# Patient Record
Sex: FEMALE | Race: WHITE | Employment: FULL TIME | URBAN - METROPOLITAN AREA
[De-identification: names, ages, dates, MRNs, and addresses within clinical notes are randomized per-mention and may not be internally consistent; named-entity substitution may affect disease eponyms.]

---

## 2019-10-11 ENCOUNTER — OFFICE VISIT (OUTPATIENT)
Dept: FAMILY MEDICINE CLINIC | Age: 24
End: 2019-10-11
Payer: COMMERCIAL

## 2019-10-11 VITALS
TEMPERATURE: 98.2 F | BODY MASS INDEX: 29.08 KG/M2 | DIASTOLIC BLOOD PRESSURE: 80 MMHG | OXYGEN SATURATION: 98 % | HEIGHT: 62 IN | SYSTOLIC BLOOD PRESSURE: 100 MMHG | WEIGHT: 158 LBS | HEART RATE: 83 BPM

## 2019-10-11 DIAGNOSIS — Z13.79 GENETIC TESTING: ICD-10-CM

## 2019-10-11 DIAGNOSIS — Z80.8 FAMILY HISTORY OF MELANOMA: ICD-10-CM

## 2019-10-11 DIAGNOSIS — Z12.4 CERVICAL CANCER SCREENING: ICD-10-CM

## 2019-10-11 DIAGNOSIS — F41.1 GENERALIZED ANXIETY DISORDER: ICD-10-CM

## 2019-10-11 DIAGNOSIS — Z76.89 ENCOUNTER TO ESTABLISH CARE: Primary | ICD-10-CM

## 2019-10-11 DIAGNOSIS — Z80.3 FAMILY HISTORY OF BREAST CANCER: ICD-10-CM

## 2019-10-11 PROCEDURE — 99203 OFFICE O/P NEW LOW 30 MIN: CPT | Performed by: PHYSICIAN ASSISTANT

## 2019-10-11 RX ORDER — PAROXETINE HYDROCHLORIDE 25 MG/1
25 TABLET, FILM COATED, EXTENDED RELEASE ORAL EVERY MORNING
Qty: 90 TABLET | Refills: 3 | Status: SHIPPED | OUTPATIENT
Start: 2019-10-11 | End: 2020-07-16 | Stop reason: DRUGHIGH

## 2019-10-11 RX ORDER — PAROXETINE HYDROCHLORIDE 25 MG/1
25 TABLET, FILM COATED, EXTENDED RELEASE ORAL EVERY MORNING
Refills: 2 | COMMUNITY
Start: 2019-07-09 | End: 2019-10-11 | Stop reason: SDUPTHER

## 2019-10-11 SDOH — HEALTH STABILITY: MENTAL HEALTH: HOW OFTEN DO YOU HAVE A DRINK CONTAINING ALCOHOL?: NEVER

## 2019-10-11 ASSESSMENT — ENCOUNTER SYMPTOMS
VOMITING: 0
VOICE CHANGE: 0
DIARRHEA: 0
BLOOD IN STOOL: 0
COUGH: 0
TROUBLE SWALLOWING: 0
SHORTNESS OF BREATH: 0
WHEEZING: 0
COLOR CHANGE: 0
ABDOMINAL PAIN: 0

## 2019-10-11 ASSESSMENT — PATIENT HEALTH QUESTIONNAIRE - PHQ9
2. FEELING DOWN, DEPRESSED OR HOPELESS: 0
SUM OF ALL RESPONSES TO PHQ9 QUESTIONS 1 & 2: 0
DEPRESSION UNABLE TO ASSESS: FUNCTIONAL CAPACITY MOTIVATION LIMITS ACCURACY
1. LITTLE INTEREST OR PLEASURE IN DOING THINGS: 0
SUM OF ALL RESPONSES TO PHQ QUESTIONS 1-9: 0
SUM OF ALL RESPONSES TO PHQ QUESTIONS 1-9: 0

## 2019-12-12 ENCOUNTER — OFFICE VISIT (OUTPATIENT)
Dept: DERMATOLOGY | Age: 24
End: 2019-12-12
Payer: COMMERCIAL

## 2019-12-12 DIAGNOSIS — L70.0 ACNE VULGARIS: Primary | ICD-10-CM

## 2019-12-12 PROCEDURE — 99203 OFFICE O/P NEW LOW 30 MIN: CPT | Performed by: DERMATOLOGY

## 2019-12-12 RX ORDER — TRETINOIN 0.5 MG/G
CREAM TOPICAL
Qty: 45 G | Refills: 4 | Status: SHIPPED | OUTPATIENT
Start: 2019-12-12 | End: 2020-07-16

## 2019-12-12 RX ORDER — SPIRONOLACTONE 50 MG/1
50 TABLET, FILM COATED ORAL DAILY
Qty: 90 TABLET | Refills: 3 | Status: SHIPPED | OUTPATIENT
Start: 2019-12-12 | End: 2020-03-12 | Stop reason: SDUPTHER

## 2019-12-13 ENCOUNTER — TELEPHONE (OUTPATIENT)
Dept: DERMATOLOGY | Age: 24
End: 2019-12-13

## 2019-12-13 NOTE — TELEPHONE ENCOUNTER
Lukasz Reina Key: [id number] - [place] Case ID: [id number] - Rx #: [id number] Need help?  Call us at [phone]   Status   Sent to [facility]   DrugTretinoin 0.05% cream   FormOptumRx Electronic Prior Authorization Form (3298 NCPDP)   Original Claim [id number]

## 2020-01-17 ENCOUNTER — HOSPITAL ENCOUNTER (OUTPATIENT)
Age: 25
Discharge: HOME | End: 2020-01-17
Payer: COMMERCIAL

## 2020-01-17 ENCOUNTER — OFFICE VISIT (OUTPATIENT)
Dept: FAMILY MEDICINE CLINIC | Age: 25
End: 2020-01-17
Payer: COMMERCIAL

## 2020-01-17 ENCOUNTER — HOSPITAL ENCOUNTER (OUTPATIENT)
Dept: GENERAL RADIOLOGY | Age: 25
Discharge: HOME | End: 2020-01-17
Payer: COMMERCIAL

## 2020-01-17 VITALS
DIASTOLIC BLOOD PRESSURE: 60 MMHG | BODY MASS INDEX: 29.08 KG/M2 | HEART RATE: 89 BPM | WEIGHT: 159 LBS | OXYGEN SATURATION: 98 % | SYSTOLIC BLOOD PRESSURE: 100 MMHG

## 2020-01-17 PROCEDURE — 73502 X-RAY EXAM HIP UNI 2-3 VIEWS: CPT

## 2020-01-17 PROCEDURE — 99213 OFFICE O/P EST LOW 20 MIN: CPT | Performed by: PHYSICIAN ASSISTANT

## 2020-01-17 ASSESSMENT — ENCOUNTER SYMPTOMS: COLOR CHANGE: 0

## 2020-01-17 ASSESSMENT — PATIENT HEALTH QUESTIONNAIRE - PHQ9
SUM OF ALL RESPONSES TO PHQ9 QUESTIONS 1 & 2: 0
SUM OF ALL RESPONSES TO PHQ QUESTIONS 1-9: 0
2. FEELING DOWN, DEPRESSED OR HOPELESS: 0
SUM OF ALL RESPONSES TO PHQ QUESTIONS 1-9: 0
1. LITTLE INTEREST OR PLEASURE IN DOING THINGS: 0

## 2020-01-17 NOTE — PROGRESS NOTES
VIEWS); Future    Trochanteric bursitis of left hip        -    Discussed treatment for this is by rest, avoiding repetitive exercises and possibly steroidal injection. She declines injection at this time which is reasonable given that ibuprofen is helpful.

## 2020-03-12 ENCOUNTER — OFFICE VISIT (OUTPATIENT)
Dept: DERMATOLOGY | Age: 25
End: 2020-03-12
Payer: COMMERCIAL

## 2020-03-12 PROBLEM — L70.0 ACNE VULGARIS: Status: ACTIVE | Noted: 2020-03-12

## 2020-03-12 PROBLEM — D22.9 MULTIPLE BENIGN MELANOCYTIC NEVI: Status: ACTIVE | Noted: 2020-03-12

## 2020-03-12 PROBLEM — Z80.8 FAMILY HISTORY OF MALIGNANT MELANOMA: Status: ACTIVE | Noted: 2020-03-12

## 2020-03-12 PROBLEM — Z12.83 SKIN CANCER SCREENING: Status: ACTIVE | Noted: 2020-03-12

## 2020-03-12 PROCEDURE — 99214 OFFICE O/P EST MOD 30 MIN: CPT | Performed by: DERMATOLOGY

## 2020-03-12 RX ORDER — SPIRONOLACTONE 100 MG/1
100 TABLET, FILM COATED ORAL DAILY
Qty: 90 TABLET | Refills: 3 | Status: SHIPPED | OUTPATIENT
Start: 2020-03-12 | End: 2022-03-11

## 2020-03-12 NOTE — PATIENT INSTRUCTIONS
Please be mindful of your sun protection strategies, including use of broad spectrum sunscreen with SPF of at least 30, sun guard clothing with UPF (available at most sporting [employer] stores), broad brimmed hat, sunglasses, and sun avoidance during peak hours of the day. ABCDE's of melanoma to take note of:     ASYMMETRY OF MOLE,   BORDER STRANGE,   COLOR CHANGING OR BECOMING DARKER,   DIAMETER ENLARGING,   EVOLUTION (EXISTING MOLE IS CHANGING WITH ANY OF THE AFOREMENTIONED CHANGES, MOLE IS BLEEDING OR TENDER, OR NEW MOLES DEVELOPING SUDDENLY)    Also, please be sure to see dentist twice yearly and ensure someone (whether it's me,  your PCP, or gynecologist) is looking at genitals once yearly. Melanoma and other skin cancers can occur anywhere! Please email me through [place] or call office if any spot or mole changes, defined as becoming larger, darker, multi-colored, or becomes symptomatic (bleeding, tender, etc).

## 2020-03-12 NOTE — PROGRESS NOTES
of the following and were unremarkable except as otherwise noted below:scalp, hair, face, ears, mucosa of gums/teeth/cutaneous and mucosal lips, buccal mucosa, oropharynx, neck, breast/axilla/chest, abdomen, groin, back, buttocks, RUE, LUE, RLE, LLE, digits/nails, apocrine/eccrine glands; genital exam deferred per pt request    Abnormalities noted include:    1.) Lower face with few erythematous papules and pustules admixed with closed comedones and atrophic scars  2.) Torso and extremities with several variably sized scattered brown to tan round smooth melanocytic macules and papules       ASSESSMENT AND PLAN:    1.)  Acne vulgaris, mixed, mod severe:  - Cont tret 0.05% cream qhs; edu: photosensitivity, irritation, desquamation, redness   - Increase aldactone to 100 mg daily; edu: re risks of hyperkalemia, monitor for mm cramps or weakness; caution with high K+ foods, use of ACE-I, ARBs, aliskiren, MIRTA/Amita OCPs, agranulocytosis (rare), teratogenicity (feminization of male fetuses), presence in breast milk, category X medication, possible need for birth control, small risk of estrogen-dependent tumors (found in rats taking doses up to 250x those humans ingest), weight gain or even loss with diuresis, menstrual irregularity, menorrhagia, painful periods, breast tenderness. Complete androgen suppression may take 4-12 months of therapy   - Consider accutane in future giving scarring potential    2.) Multiple scattered acquired melanocytic nevi with banal features:   - Reassurance  - Edu: patient regarding sun protection strategies, including use of broad spectrum sunscreen with SPF of at least 30, sun guard clothing, broad brimmed hat, sunglasses, and sun avoidance during peak hours of the day. ABCDE's of melanoma also reviewed    3.) Family h/o MM in 1st deg relative:  - Recommend yearly FBSE    Return to Clinic:  3 mo; yearly skin checks  Discussed plan with patient and/or primary caretaker.    Patient to call

## 2020-04-11 PROBLEM — Z12.83 SKIN CANCER SCREENING: Status: RESOLVED | Noted: 2020-03-12 | Resolved: 2020-04-11

## 2020-05-20 ENCOUNTER — TELEPHONE (OUTPATIENT)
Dept: FAMILY MEDICINE CLINIC | Age: 25
End: 2020-05-20

## 2020-05-21 ENCOUNTER — TELEMEDICINE (OUTPATIENT)
Dept: FAMILY MEDICINE CLINIC | Age: 25
End: 2020-05-21
Payer: COMMERCIAL

## 2020-05-21 PROCEDURE — 99213 OFFICE O/P EST LOW 20 MIN: CPT | Performed by: PHYSICIAN ASSISTANT

## 2020-05-21 RX ORDER — METHYLPREDNISOLONE 4 MG/1
TABLET ORAL
Qty: 1 KIT | Refills: 0 | Status: SHIPPED | OUTPATIENT
Start: 2020-05-21 | End: 2020-05-27

## 2020-05-21 ASSESSMENT — ENCOUNTER SYMPTOMS: COLOR CHANGE: 0

## 2020-05-21 NOTE — PROGRESS NOTES
2020    TELEHEALTH EVALUATION -- Audio/Visual (During OTHFK-81 public health emergency)    HPI:    Eugenio Cross (:  1995) has requested an audio/video evaluation for the following concern(s):    Knee Pain: Started two weeks ago in both knees. Painful if she presses on the knee cap and just below the patella. She denies any redness or swelling of either joint. There is no weakness, laxity, or instability noted. Aggravated by a work out routine and walking up stairs. Has been using icy hot at home, ice packs,ibuprofen and heating pad. She stopped this exercise routine 3 days ago and has noticed some improved. No known injury. Review of Systems   Constitutional: Positive for activity change. Musculoskeletal: Positive for arthralgias. Negative for gait problem, joint swelling and myalgias. Skin: Negative for color change. Neurological: Negative for weakness and numbness. Prior to Visit Medications    Medication Sig Taking? Authorizing Provider   methylPREDNISolone (MEDROL, ALEN,) 4 MG tablet Take by mouth as directed.  Yes BRIONNA Frank   spironolactone (ALDACTONE) 100 MG tablet Take 1 tablet by mouth daily Yes Ping Jackson MD   tretinoin (RETIN-A) 0.05 % cream Apply a pea sized amount to the face QHS Yes Ping Jackson MD   PARoxetine (PAXIL CR) 25 MG extended release tablet Take 1 tablet by mouth every morning Yes BRIONNA Frank       Social History     Tobacco Use    Smoking status: Never Smoker    Smokeless tobacco: Never Used   Substance Use Topics    Alcohol use: Never     Frequency: Never    Drug use: Never        No Known Allergies, No past medical history on file., No past surgical history on file.,   Social History     Tobacco Use    Smoking status: Never Smoker    Smokeless tobacco: Never Used   Substance Use Topics    Alcohol use: Never     Frequency: Never    Drug use: Never       PHYSICAL EXAMINATION:  [ INSTRUCTIONS:  \"[x]\" Indicates a positive item  \"[]\" Indicates a negative item  -- DELETE ALL ITEMS NOT EXAMINED]  Vital Signs: (As obtained by patient/caregiver or practitioner observation)    Blood pressure-  Heart rate-    Respiratory rate- 14   Temperature-  Pulse oximetry-     Constitutional: [x] Appears well-developed and well-nourished [x] No apparent distress      [] Abnormal-   Mental status  [x] Alert and awake  [x] Oriented to person/place/time [x]Able to follow commands      Eyes:  EOM    []  Normal  [] Abnormal-  Sclera  []  Normal  [] Abnormal -         Discharge []  None visible  [] Abnormal -    HENT:   [x] Normocephalic, atraumatic. [] Abnormal   [] Mouth/Throat: Mucous membranes are moist.     External Ears [] Normal  [] Abnormal-     Neck: [] No visualized mass     Pulmonary/Chest: [x] Respiratory effort normal.  [] No visualized signs of difficulty breathing or respiratory distress        [] Abnormal-      Musculoskeletal:   [x] Normal gait with no signs of ataxia         [] Normal range of motion of neck        [] Abnormal-       Neurological:        [] No Facial Asymmetry (Cranial nerve 7 motor function) (limited exam to video visit)          [] No gaze palsy        [] Abnormal-         Skin:        [] No significant exanthematous lesions or discoloration noted on facial skin         [] Abnormal-            Psychiatric:       [] Normal Affect [] No Hallucinations        [] Abnormal-     Other pertinent observable physical exam findings- No swelling of knees, abnormal sounds with AROM or effusion noted through video    ASSESSMENT/PLAN:  1. Patellar tendinitis of both knees  -  Stop workout for the next two weeks. Ice as needed. Tylenol for pain. Start steroid. If there is no improvement or worsening symptoms please call.   - methylPREDNISolone (MEDROL, ALEN,) 4 MG tablet; Take by mouth as directed. Dispense: 1 kit; Refill: 0      No follow-ups on file.     Eugenio Cross is a 30 y.o. female being evaluated by a Virtual Visit (video

## 2020-06-26 ENCOUNTER — OFFICE VISIT (OUTPATIENT)
Dept: PRIMARY CARE CLINIC | Age: 25
End: 2020-06-26
Payer: COMMERCIAL

## 2020-06-26 PROCEDURE — 99213 OFFICE O/P EST LOW 20 MIN: CPT | Performed by: NURSE PRACTITIONER

## 2020-06-29 LAB
SARS-COV-2: NOT DETECTED
SOURCE: NORMAL

## 2020-07-16 ENCOUNTER — VIRTUAL VISIT (OUTPATIENT)
Dept: FAMILY MEDICINE CLINIC | Age: 25
End: 2020-07-16
Payer: COMMERCIAL

## 2020-07-16 VITALS — HEIGHT: 62 IN | BODY MASS INDEX: 27.6 KG/M2 | WEIGHT: 150 LBS

## 2020-07-16 PROBLEM — F41.1 GENERALIZED ANXIETY DISORDER: Status: ACTIVE | Noted: 2020-07-16

## 2020-07-16 PROBLEM — M76.52 PATELLAR TENDINITIS OF BOTH KNEES: Status: ACTIVE | Noted: 2020-07-16

## 2020-07-16 PROBLEM — M76.51 PATELLAR TENDINITIS OF BOTH KNEES: Status: ACTIVE | Noted: 2020-07-16

## 2020-07-16 PROCEDURE — 99213 OFFICE O/P EST LOW 20 MIN: CPT | Performed by: PHYSICIAN ASSISTANT

## 2020-07-16 RX ORDER — PAROXETINE HYDROCHLORIDE 40 MG/1
40 TABLET, FILM COATED ORAL DAILY
Qty: 30 TABLET | Refills: 3 | Status: SHIPPED | OUTPATIENT
Start: 2020-07-16 | End: 2020-11-12 | Stop reason: SDUPTHER

## 2020-07-16 ASSESSMENT — ENCOUNTER SYMPTOMS
COLOR CHANGE: 0
BACK PAIN: 0

## 2020-07-16 NOTE — PROGRESS NOTES
2020    TELEHEALTH EVALUATION -- Audio/Visual (During [place] [address] public health emergency)    HPI:    Gwyn  (:  1995) has requested an audio/video evaluation for the following concern(s): Anxiety:  Noticing increased anxiety since February. Current triggers: COVID. She is now having up to four panic attacks per month. She notices that she's more fatigued and sleeping more than normal. There is some mood changes but she denies having any depression. She also notices that she is easily overwhelmed. Sleep is variable but for the most part ok. She denies having any dysphoric mood, impulsive behaviors, SI/HI. Currently taking Paxil 25 mg. Pt is interested in dose adjustment. Patellar Tendonitis: Pt reports that steroid did improve symptoms for a short period of time but they are now back. Initial symptoms occurred in May of this year. She currently notices bilateral pain in both knees after working out. There is some stiffness in knees after being in the same position for an extended period of time. She denies any cracking, locking, swelling or instability. She is not taking anything for the pain. Pt is interested in seeing ortho. Review of Systems   Constitutional: Negative for unexpected weight change. Musculoskeletal: Positive for arthralgias. Negative for back pain, gait problem, joint swelling and myalgias. Skin: Negative for color change. Neurological: Negative for weakness and numbness. Psychiatric/Behavioral: Negative for agitation, behavioral problems, confusion, decreased concentration, dysphoric mood and hallucinations. The patient is nervous/anxious. The patient is not hyperactive. Prior to Visit Medications    Medication Sig Taking?  Authorizing Provider   PARoxetine (PAXIL) 40 MG tablet Take 1 tablet by mouth daily Yes BRIONNA Rangel   spironolactone (ALDACTONE) 100 MG tablet Take 1 tablet by mouth daily Yes Betty Verdin MD       Social History pleasant and cooperative  Other pertinent observable physical exam findings- No swelling or erythema of the bilateral knees    ASSESSMENT/PLAN:  1. Patellar tendinitis of both knees  -  NSAIDs, rest and ice as needed  - [address] [place], DO Leigha, Orthopedic Surgery, [facility]    2. Generalized anxiety disorder  -  Increase Paxil to 40 mg. Pt to follow up with me in four weeks if she is not noticing any improvement. Would consider changing her to Zoloft at that time. - PARoxetine (PAXIL) 40 MG tablet; Take 1 tablet by mouth daily  Dispense: 30 tablet; Refill: 3      No follow-ups on file. Sofiya Carlton is a 30 y.o. female being evaluated by a Virtual Visit (video visit) encounter to address concerns as mentioned above. A caregiver was present when appropriate. Due to this being a TeleHealth encounter (During QAB-59 public health emergency), evaluation of the following organ systems was limited: Vitals/Constitutional/EENT/Resp/CV/GI//MS/Neuro/Skin/Heme-Lymph-Imm. Pursuant to the emergency declaration under the [address] [place] [address], [address] [place] [address] authority and the [employer] and [employer] Act, this Virtual Visit was conducted with patient's (and/or legal guardian's) consent, to reduce the patient's risk of exposure to COVID-19 and provide necessary medical care. The patient (and/or legal guardian) has also been advised to contact this office for worsening conditions or problems, and seek emergency medical treatment and/or call 911 if deemed necessary. Patient identification was verified at the start of the visit: Yes    Total time spent on this encounter: Not billed by time    Services were provided through a video synchronous discussion virtually to substitute for in-person clinic visit. Patient and provider were located at their individual homes.     --BRIONNA Duran on 7/16/2020 at 10:59 AM    An electronic signature was used to authenticate this note.

## 2020-07-20 ENCOUNTER — OFFICE VISIT (OUTPATIENT)
Dept: ORTHOPEDIC SURGERY | Age: 25
End: 2020-07-20
Payer: COMMERCIAL

## 2020-07-20 VITALS — WEIGHT: 150 LBS | HEIGHT: 62 IN | BODY MASS INDEX: 27.6 KG/M2

## 2020-07-20 PROCEDURE — 99203 OFFICE O/P NEW LOW 30 MIN: CPT | Performed by: ORTHOPAEDIC SURGERY

## 2020-07-20 NOTE — PROGRESS NOTES
I am evaluating this patient as a consult at the request of BRIONNA Rojas [id number]  [facility], [address] [facility] [address]     Assessment : bilateral patellofemoral syndrome    Impression:  Encounter Diagnoses   Name Primary?  Pain in both knees, unspecified chronicity     Patellofemoral syndrome of both knees Yes       Office Procedures:  Orders Placed This Encounter   Procedures    XR KNEE RIGHT (MIN 4 VIEWS)     Order Specific Question:   Reason for exam:     Answer:   RIGHT KNEE PAIN    XR KNEE LEFT (MIN 4 VIEWS)     Order Specific Question:   Reason for exam:     Answer:   LEFT KNEE PAIN    [facility] PT - [facility]     Referral Priority:   Routine     Referral Type:   Eval and Treat     Referral Reason:   Specialty Services Required     Requested Specialty:   Physical Therapy     Number of Visits Requested:   1     No orders of the defined types were placed in this encounter. Treatment Plan: I had a lengthy discussion with the patient about the pathophysiology of patellafemoral syndrome , as well as treatment options. The importance of physical therapy for hamstring, VMO, core and glute strengthening was discussed in detail as well as the importance of avoidance of aggravating activites such as squats, lunges, stair climbing. I recommend conservative treatment, including physical therapy, and oral medications. Today we prescribed PT with dry needling and biomechanics for squat, lunge technique. They understand that PFS can require long-term physical therapy to alleviate symptoms. She will follow up in 2-3 months for reevaluation. Patient agrees with this plan, all of their questions were answered best of our ability and to their satisfaction.       Chief Complaint:  Knee Pain (Bilat knee pain, right greater than left)      History of Present Illness:  Nusrat Sotomayor is a 30 y.o. female here regarding bilateral knee pain, patient to 3 to 4 months ago she began doing his Physically abused: None     Forced sexual activity: None   Other Topics Concern    None   Social History Narrative    None     No Known Allergies    Review of Systems:  Constitutional: negative  Respiratory: negative  Cardiovascular: negative  Musculoskeletal:negative except for Knee Pain (Bilat knee pain, right greater than left)    Relevant review of systems reviewed and available in the patient's chart in media tab    Vital Signs:  Vitals:    07/20/20 1425   Weight: 150 lb (68 kg)   Height: 5' 2\" (1.575 m)         General Exam:   Constitutional: Patient is adequately groomed with no evidence of malnutrition  Mental Status: The patient is oriented to time, place and person. The patient's mood and affect are appropriate. Vascular: Examination reveals no swelling or calf tenderness. Peripheral pulses are palpable and 2+.    bilateral Knee Examination  Inspection:   No gross deformities noted. no swelling noted. No erythema or ecchymosis. Skin is intact. Palpation: negative Tenderness to palpation along the medial joint line, negative Tenderness to palpation along lateral joint line, positive Tenderness to palpation along medial and lateral facets of undersurface of the patella    Range of Motion:  0-130    Strength:  Able to do SLR    Special Tests:  ACL, MCL, PCL, LCL are intact with stress exam.  There negative crepitus noted with range of motion under the patella. A positive grind test.  negative Delmi's and Apley compression test.       Skin: There are no rashes, ulcerations or lesions. Gait: Normal gait    Reflex: not tested    Additional Examinations:    LUMBAR SPINE: The skin is warm and dry. There is no swelling, warmth, or erythema. Range of motion is within normal limits. There is no paraspinal or spinous process tenderness. Ipsilateral and contralateral straight leg raising tests are negative. The distal neurovascular exam is grossly intact and symmetric.     X-RAYS: 4 views

## 2020-11-12 RX ORDER — PAROXETINE HYDROCHLORIDE 40 MG/1
40 TABLET, FILM COATED ORAL DAILY
Qty: 30 TABLET | Refills: 3 | Status: SHIPPED | OUTPATIENT
Start: 2020-11-12 | End: 2021-04-12 | Stop reason: SDUPTHER

## 2020-11-12 NOTE — TELEPHONE ENCOUNTER
Last office visit 7/16/2020     Last written 7- x 3 refills    Next office visit scheduled  Not scheduled    Requested Prescriptions     Pending Prescriptions Disp Refills    PARoxetine (PAXIL) 40 MG tablet 30 tablet 3     Sig: Take 1 tablet by mouth daily

## 2020-12-28 ENCOUNTER — VIRTUAL VISIT (OUTPATIENT)
Dept: FAMILY MEDICINE CLINIC | Age: 25
End: 2020-12-28
Payer: COMMERCIAL

## 2020-12-28 PROCEDURE — 99213 OFFICE O/P EST LOW 20 MIN: CPT | Performed by: PHYSICIAN ASSISTANT

## 2020-12-28 RX ORDER — CYCLOBENZAPRINE HCL 5 MG
5 TABLET ORAL 3 TIMES DAILY PRN
Qty: 30 TABLET | Refills: 0 | Status: SHIPPED | OUTPATIENT
Start: 2020-12-28 | End: 2021-01-07

## 2020-12-28 RX ORDER — METHYLPREDNISOLONE 4 MG/1
TABLET ORAL
Qty: 1 KIT | Refills: 0 | Status: SHIPPED | OUTPATIENT
Start: 2020-12-28 | End: 2021-01-03

## 2020-12-28 ASSESSMENT — ENCOUNTER SYMPTOMS
BACK PAIN: 1
COLOR CHANGE: 0

## 2020-12-28 NOTE — PROGRESS NOTES
[ INSTRUCTIONS:  \"[x]\" Indicates a positive item  \"[]\" Indicates a negative item  -- DELETE ALL ITEMS NOT EXAMINED]  Vital Signs: (As obtained by patient/caregiver or practitioner observation)    Blood pressure-  Heart rate-    Respiratory rate- 14   Temperature-  Pulse oximetry-     Constitutional: [x] Appears well-developed and well-nourished [x] No apparent distress      [] Abnormal-   Mental status  [x] Alert and awake  [x] Oriented to person/place/time [x]Able to follow commands      Eyes:  EOM    []  Normal  [] Abnormal-  Sclera  []  Normal  [] Abnormal -         Discharge []  None visible  [] Abnormal -    HENT:   [] Normocephalic, atraumatic. [] Abnormal   [] Mouth/Throat: Mucous membranes are moist.     External Ears [] Normal  [] Abnormal-     Neck: [] No visualized mass     Pulmonary/Chest: [x] Respiratory effort normal.  [x] No visualized signs of difficulty breathing or respiratory distress        [] Abnormal-      Musculoskeletal:   [] Normal gait with no signs of ataxia         [] Normal range of motion of neck        [x] Abnormal- decreased flexion of lumbar spine due to pain. Neurological:        [] No Facial Asymmetry (Cranial nerve 7 motor function) (limited exam to video visit)          [] No gaze palsy        [] Abnormal-         Skin:        [] No significant exanthematous lesions or discoloration noted on facial skin         [] Abnormal-            Psychiatric:       [] Normal Affect [] No Hallucinations        [] Abnormal-     Other pertinent observable physical exam findings-     ASSESSMENT/PLAN:  1. Lumbar back pain  - methylPREDNISolone (MEDROL, ALEN,) 4 MG tablet; Take by mouth as directed. Dispense: 1 kit; Refill: 0  - cyclobenzaprine (FLEXERIL) 5 MG tablet; Take 1 tablet by mouth 3 times daily as needed for Muscle spasms  Dispense: 30 tablet; Refill: 0    2. Right hip pain  - methylPREDNISolone (MEDROL, ALEN,) 4 MG tablet; Take by mouth as directed. Dispense: 1 kit;  Refill: 0 Return if symptoms worsen or fail to improve. Carolina Eid is a 30 y.o. female being evaluated by a Virtual Visit (video visit) encounter to address concerns as mentioned above. A caregiver was present when appropriate. Due to this being a TeleHealth encounter (During ISYCX-88 public health emergency), evaluation of the following organ systems was limited: Vitals/Constitutional/EENT/Resp/CV/GI//MS/Neuro/Skin/Heme-Lymph-Imm. Pursuant to the emergency declaration under the [address] [place] [address], [address] [place] [address] authority and the [employer] and [employer] Act, this Virtual Visit was conducted with patient's (and/or legal guardian's) consent, to reduce the patient's risk of exposure to COVID-19 and provide necessary medical care. The patient (and/or legal guardian) has also been advised to contact this office for worsening conditions or problems, and seek emergency medical treatment and/or call 911 if deemed necessary. Patient identification was verified at the start of the visit: Yes    Total time spent on this encounter: Not billed by time    Services were provided through a video synchronous discussion virtually to substitute for in-person clinic visit. Patient and provider were located at their individual homes. --BRIONNA Hinton on 12/28/2020 at 4:47 PM    An electronic signature was used to authenticate this note.

## 2021-01-12 ENCOUNTER — HOSPITAL ENCOUNTER (OUTPATIENT)
Dept: PHYSICAL THERAPY | Age: 26
Setting detail: THERAPIES SERIES
Discharge: HOME | End: 2021-01-12
Payer: COMMERCIAL

## 2021-01-12 PROCEDURE — 97110 THERAPEUTIC EXERCISES: CPT

## 2021-01-12 PROCEDURE — 97161 PT EVAL LOW COMPLEX 20 MIN: CPT

## 2021-01-12 NOTE — FLOWSHEET NOTE
[facility], [facility], [address]  [place], [address]  Phone: [phone]  Fax [phone]    Physical Therapy Daily Treatment Note    Date:  2021    Patient Name:  Xin Whyte    :  1995  MRN: [id number]  Restrictions/Precautions:    Medical/Treatment Diagnosis Information:  Diagnosis: M22.2X1, M22.2X2 (ICD-10-CM) - Patellofemoral syndrome of both knees  Insurance/Certification information:  PT Insurance Information: [employer]  Physician Information:  Referring Practitioner: BRIONNA Hutton  Plan of care signed (Y/N):  Y  Visit# / total visits:       G-Code (if applicable):           Modified Oswestry:     Time in:   16:05      Timed Treatment: 15 Total Treatment Time:  50  Time out: 16:55  ________________________________________________________________________________________    Pain Level:    /10  SUBJECTIVE:  See Initial Evaluation     OBJECTIVE:     Exercise/Equipment Resistance/Repetitions Other comments     Bridges 5\"x10      Clamshell 5\"x10      TA 2 min      TG  5 min                                                                                                                   Other Therapeutic Activities:  Pt was educated on diagnosis, involved anatomy, POC, established goals, and facility policies     Manual Treatments:         Modalities:      Test/Measurements:   See Initial Evaluation        ASSESSMENT:   See Initial Evaluation        Treatment/Activity Tolerance:   [x]Patient tolerated treatment well [] Patient limited by fatique  []Patient limited by pain [] Patient limited by other medical complications  [] Other:     Goals:        Long term goals  Time Frame for Long term goals : 6 weeks  Long term goal 1: Pt will be independent and compliant with HEP  Long term goal 2: Pt will improve bilateral hip strength to 4/5 gross strength  Long term goal 3: Pt will report at least 50% improvement in frequency and intensity of back/hip pain  Long term goal 4: Pt will ambulate will proper hip extension and no sign of trendelenburg bilaterally  Long term goal 5: Pt will improve lumbopelvic strength to 4/5     Plan: [] Continue per plan of care [] Alter current plan (see comments)   [x] Plan of care initiated [] Hold pending MD visit [] Discharge      Plan for Next Session:      Re-Certification Due Date:         Signature:   Randi Alexander PT

## 2021-01-12 NOTE — PROGRESS NOTES
piriformis  Observation: Rounded shoulders, FHP, posterior pelvic tilt. Standing: pes planus, mild hip extension, increased lumbar lordosis. Ambulation, decreased hip extension, mild trendelenburg bilaterally. Body Mechanics: Squat: Mild IR of tibia, mild quad dominant SLS: compensated trendelenberg bilaterally    Joint Mobility  Spine: Lumba: Flexion: 25% restriced      Extension: 25% restricted    R Rotation full   L Rotation full    L Side Bending 25% restriction      R Side Bendin% restricted    Strength RLE  R Hip Flexion: 4+/5  R Hip Extension: 3+/5  R Hip ABduction: 3+/5  R Hip Internal Rotation: 3+/5  R Hip External Rotation: 3+/5  R Knee Flexion: 5/5  R Knee Extension: 5/5  R Ankle Dorsiflexion: 5/5  R Ankle Plantar flexion: 5/5  Strength LLE  Strength LLE: Exception  L Hip Flexion: 4+/5  L Hip Extension: 3+/5  L Hip ABduction: 3+/5  L Hip Internal Rotation: 3+/5  L Hip External Rotation: 3+/5  L Knee Flexion: 5/5  L Knee Extension: 5/5  L Ankle Dorsiflexion: 5/5  L Ankle Plantar Flexion: 5/5     Additional Measures  Special Tests: Clonus (-), Babinski (-), DTR: normal bilateral achilles and patella Slump (-), compression (-), distraction (-), FADDIR (-), DWIGHT (-)  Sensation  Overall Sensation Status: WNL    Assessment   Conditions Requiring Skilled Therapeutic Intervention  Body structures, Functions, Activity limitations: Decreased functional mobility ; Decreased balance;Decreased ADL status; Decreased ROM; Decreased high-level IADLs; Increased pain;Decreased strength;Decreased posture  Assessment: Pt is a 29 y/o female that presents to [address] [place] [address] clinic with an increase in hip and back pain that occured in mid December after trying to lift a box. Pt presents with impaired hip/lumbopelvic strength, gait mechanics, muscle firing patterns, and hip ROM. Pt also presented with L hip posterior rotation that was corrected after manual interventions.  Pt would benefit from skilled physical therapy in order to improve impairments and progres toward goals. Prognosis: Good  Decision Making: Low Complexity  REQUIRES PT FOLLOW UP: Yes         Plan   Plan  Times per week: 2x/week for 6 weeks  Plan weeks: 6 weeks  Current Treatment Recommendations: Strengthening, Neuromuscular Re-education, Home Exercise Program, ROM, Manual Therapy - Soft Tissue Mobilization, Balance Training, Functional Mobility Training, Manual Therapy - Joint Manipulation, Gait Training, Stair training, Pain Management    G-Code   Modified Oswestry: /50    Goals  Long term goals  Time Frame for Long term goals : 6 weeks  Long term goal 1: Pt will be independent and compliant with HEP  Long term goal 2: Pt will improve bilateral hip strength to 4/5 gross strength  Long term goal 3: Pt will report at least 50% improvement in frequency and intensity of back/hip pain  Long term goal 4: Pt will ambulate will proper hip extension and no sign of trendelenburg bilaterally  Long term goal 5: Pt will improve lumbopelvic strength to 4/5  Patient Goals   Patient goals : Pt stated she would like to decrease back pain       Therapy Time   Individual Concurrent Group Co-treatment   Time In 1605         Time Out 1655         Minutes 50         Timed Code Treatment Minutes: 69 Carlitos Lawton PT          Outpatient Physical Therapy  Phone: [phone] Fax: [phone]     To: BRIONNA Smith    From: Isabel Page PT     Patient: Samantha Ramon      : 1995  Diagnosis:M22.2X1, M22.2X2 (ICD-10-CM) - Patellofemoral syndrome of both knees          Date: 2021  Treatment Diagnosis:  Back, Hip, Knee Pain     Physical Therapy Certification/Re-Certification Form  Dear BRIONNA Ybarra  The following patient has been evaluated for physical therapy services and for therapy to continue, Medicare requires monthly physician review of the treatment plan.  Please review the attached evaluation and/or summary of the patient's plan of care, and verify that you agree therapy should continue by signing the attached document and sending it back to our office. Plan of Care/Treatment to date:  [x] Therapeutic Exercise   [x] Modalities:  [] Therapeutic Activity     [] Ultrasound  [] Electrical Stimulation   [x] Gait Training      [] Cervical Traction [] Lumbar Traction  [x] Neuromuscular Re-education  [] Hot/Coldpack [] Iontophoresis    [x] Instruction in HEP      Other:  [x] Manual Therapy       []                        [] Aquatic Therapy       []                      ? Frequency/Duration:  # Days per week: [] 1 day # Weeks: [] 1 week [] 5 weeks      [x] 2 days? [] 2 weeks [x] 6 weeks     [] 3 days   [] 3 weeks [] 7 weeks     [] 4 days   [] 4 weeks [] 8 weeks    Rehab Potential: [] Excellent [x] Good [] Fair  [] Poor       Electronically signed by:  Gabriella Dow PT      If you have any questions or concerns, please don't hesitate to call.   Thank you for your referral.    Physician Signature:________________________________Date:__________________  By signing above, therapists plan is approved by physician

## 2021-01-14 ENCOUNTER — HOSPITAL ENCOUNTER (OUTPATIENT)
Dept: PHYSICAL THERAPY | Age: 26
Setting detail: THERAPIES SERIES
Discharge: HOME | End: 2021-01-14
Payer: COMMERCIAL

## 2021-01-14 PROCEDURE — 97110 THERAPEUTIC EXERCISES: CPT

## 2021-01-14 PROCEDURE — 97140 MANUAL THERAPY 1/> REGIONS: CPT

## 2021-01-14 NOTE — FLOWSHEET NOTE
[facility], [facility], [address], [address]  Phone: [phone]  Fax [phone]    Physical Therapy Daily Treatment Note    Date:  2021    Patient Name:  Eulogio Marie    :  1995  MRN: [id number]  Restrictions/Precautions:    Medical/Treatment Diagnosis Information:  Diagnosis: M22.2X1, M22.2X2 (ICD-10-CM) - Patellofemoral syndrome of both knees  Insurance/Certification information:  PT Insurance Information: [employer]  Physician Information:  Referring Practitioner: BRIONNA Stokes  Plan of care signed (Y/N):  Y  Visit# / total visits:       G-Code (if applicable): Modified Oswestry:     Time in:   10:00      Timed Treatment: 45 Total Treatment Time:  45  Time out: 10:45  ________________________________________________________________________________________    Pain Level:    /10  SUBJECTIVE:  Pt reported that she was a little sore after her first visit. However, soreness went back down     OBJECTIVE:     Exercise/Equipment Resistance/Repetitions Other comments   Prone hip extension   PKF c ball x10B  x10      Bridges 5\"x10     Clamshell 5\"x10    TA     -BKFO     -Marches  2 min  x10B  x10B     Multifidus punch  x15B     TG  5 min                                                                                                                   Other Therapeutic Activities:      Manual Treatments:         Modalities:      Test/Measurements:    RLE LAD/compression grade 3-4  RLE neuro re-ed   L hip hit technique   STM to L piriformis and glutes   Lumbar gapping L        ASSESSMENT:  Pt tolerated treatment well as pt performed all manual and TE with no increase in pain. Core and hip strengthening progressed this date. HEP updated. Pt will progress as pt can tolerate.         Treatment/Activity Tolerance:   [x]Patient tolerated treatment well [] Patient limited by fatique  []Patient limited by pain [] Patient limited by other medical complications  [] Other:     Goals:        Long term goals  Time Frame for Long term goals : 6 weeks  Long term goal 1: Pt will be independent and compliant with HEP  Long term goal 2: Pt will improve bilateral hip strength to 4/5 gross strength  Long term goal 3: Pt will report at least 50% improvement in frequency and intensity of back/hip pain  Long term goal 4: Pt will ambulate will proper hip extension and no sign of trendelenburg bilaterally  Long term goal 5: Pt will improve lumbopelvic strength to 4/5     Plan: [x] Continue per plan of care [] Alter current plan (see comments)   [] Plan of care initiated [] Hold pending MD visit [] Discharge      Plan for Next Session:      Re-Certification Due Date:         Signature:   Cleavon Friday , PT

## 2021-01-18 ENCOUNTER — HOSPITAL ENCOUNTER (OUTPATIENT)
Dept: PHYSICAL THERAPY | Age: 26
Setting detail: THERAPIES SERIES
Discharge: HOME | End: 2021-01-18
Payer: COMMERCIAL

## 2021-01-18 PROCEDURE — 97110 THERAPEUTIC EXERCISES: CPT

## 2021-01-18 PROCEDURE — 97140 MANUAL THERAPY 1/> REGIONS: CPT

## 2021-01-18 NOTE — FLOWSHEET NOTE
[facility], [facility], [address], [address]  Phone: [phone]  Fax [phone]    Physical Therapy Daily Treatment Note    Date:  2021    Patient Name:  Kenrick Sow    :  1995  MRN: [id number]  Restrictions/Precautions:    Medical/Treatment Diagnosis Information:  Diagnosis: M22.2X1, M22.2X2 (ICD-10-CM) - Patellofemoral syndrome of both knees  Insurance/Certification information:  PT Insurance Information: [employer]  Physician Information:  Referring Practitioner: BRIONNA James  Plan of care signed (Y/N):  Y  Visit# / total visits:  3/12     G-Code (if applicable): Modified Oswestry:     Time in:   11:21      Timed Treatment: 44 Total Treatment Time:  44  Time out: 12:04  ________________________________________________________________________________________    Pain Level:    1-210  SUBJECTIVE:  Pt reported that she is about the same and her back is a little sore     OBJECTIVE:     Exercise/Equipment Resistance/Repetitions Other comments   Prone hip extension   PKF c ball x10B  x10      Bridges on ball  5\"x10     Clamshell 2 5\"x10    TA     -BKFO     -Marches  2 min  x10B  H04B     Magnetic Click  44\"E90    Multifidus punch  x10B    Multifidus punch  x15B     TG  5 min                                                                                                                   Other Therapeutic Activities:      Manual Treatments:         Modalities:      Test/Measurements:    RLE LAD/compression grade 3-4  RLE neuro re-ed     STM to L piriformis and glutes   Lumbar gapping L   Prone PA mobs  Lumbar skin rolling       ASSESSMENT:  Pt tolerated treatment well as pt performed all manual and TE with no increase in pain. Core and hip strengthening progressed this date. HEP updated. Pt will progress as pt can tolerate.         Treatment/Activity Tolerance:   [x]Patient tolerated treatment well [] Patient limited by fatique  []Patient limited by pain [] Patient limited by other medical complications  [] Other:     Goals:        Long term goals  Time Frame for Long term goals : 6 weeks  Long term goal 1: Pt will be independent and compliant with HEP  Long term goal 2: Pt will improve bilateral hip strength to 4/5 gross strength  Long term goal 3: Pt will report at least 50% improvement in frequency and intensity of back/hip pain  Long term goal 4: Pt will ambulate will proper hip extension and no sign of trendelenburg bilaterally  Long term goal 5: Pt will improve lumbopelvic strength to 4/5     Plan: [x] Continue per plan of care [] Alter current plan (see comments)   [] Plan of care initiated [] Hold pending MD visit [] Discharge      Plan for Next Session:      Re-Certification Due Date:         Signature:   Hernandez Sebastian PT

## 2021-01-20 ENCOUNTER — HOSPITAL ENCOUNTER (OUTPATIENT)
Dept: PHYSICAL THERAPY | Age: 26
Setting detail: THERAPIES SERIES
Discharge: HOME | End: 2021-01-20
Payer: COMMERCIAL

## 2021-01-20 PROCEDURE — 97110 THERAPEUTIC EXERCISES: CPT

## 2021-01-20 PROCEDURE — 97140 MANUAL THERAPY 1/> REGIONS: CPT

## 2021-01-20 NOTE — FLOWSHEET NOTE
[facility], [facility], [address], [address]  Phone: [phone]  Fax [phone]    Physical Therapy Daily Treatment Note    Date:  2021    Patient Name:  Pasha Ambrose    :  1995  MRN: [id number]  Restrictions/Precautions:    Medical/Treatment Diagnosis Information:  Diagnosis: M22.2X1, M22.2X2 (ICD-10-CM) - Patellofemoral syndrome of both knees  Insurance/Certification information:  PT Insurance Information: Javier  Physician Information:  Referring Practitioner: BRIONNA Galan  Plan of care signed (Y/N):  Y  Visit# / total visits:  3/12     G-Code (if applicable): Modified Oswestry:     Time in:   8:20    Timed Treatment: 40 Total Treatment Time:  43  Time out: 9:00  ________________________________________________________________________________________    Pain Level:    1-210  SUBJECTIVE:  Pt reported that she is doing okay today. OBJECTIVE:     Exercise/Equipment Resistance/Repetitions Other comments   Prone hip extension   PKF c ball      Bridges on ball  5\"x10     Clamshell 2     TA     -BKFO     -Marches  2 min  x10B  B61T     Magnetic Click  24\"E87    Multifidus punch      Multifidus punch  x15B    Dowel michaela training x15 seated     Lateral chain  Posterior chain x10B  x10B    Stacking  2x30\" B          TG  5 min                                                                                                                   Other Therapeutic Activities:      Manual Treatments:         Modalities:      Test/Measurements:    RLE LAD/compression grade 3-4  RLE neuro re-ed     STM to L piriformis and glutes   Lumbar gapping L   Prone PA mobs  Lumbar skin rolling       ASSESSMENT:  Pt tolerated treatment well as pt performed all manual and TE with no increase in pain. Core and hip strengthening progressed this date. HEP updated. Pt will progress as pt can tolerate. Treatment/Activity Tolerance:   [x]Patient tolerated treatment well [] Patient limited by fatique  []Patient limited by pain [] Patient limited by other medical complications  [] Other:     Goals:        Long term goals  Time Frame for Long term goals : 6 weeks  Long term goal 1: Pt will be independent and compliant with HEP  Long term goal 2: Pt will improve bilateral hip strength to 4/5 gross strength  Long term goal 3: Pt will report at least 50% improvement in frequency and intensity of back/hip pain  Long term goal 4: Pt will ambulate will proper hip extension and no sign of trendelenburg bilaterally  Long term goal 5: Pt will improve lumbopelvic strength to 4/5     Plan: [x] Continue per plan of care [] Alter current plan (see comments)   [] Plan of care initiated [] Hold pending MD visit [] Discharge      Plan for Next Session:      Re-Certification Due Date:         Signature:   Flakita Alvarez , PT

## 2021-01-25 ENCOUNTER — APPOINTMENT (OUTPATIENT)
Dept: PHYSICAL THERAPY | Age: 26
End: 2021-01-25
Payer: COMMERCIAL

## 2021-01-27 ENCOUNTER — APPOINTMENT (OUTPATIENT)
Dept: PHYSICAL THERAPY | Age: 26
End: 2021-01-27
Payer: COMMERCIAL

## 2021-02-01 ENCOUNTER — HOSPITAL ENCOUNTER (OUTPATIENT)
Dept: PHYSICAL THERAPY | Age: 26
Setting detail: THERAPIES SERIES
Discharge: HOME | End: 2021-02-01
Payer: COMMERCIAL

## 2021-02-01 PROCEDURE — 97110 THERAPEUTIC EXERCISES: CPT

## 2021-02-01 PROCEDURE — 97140 MANUAL THERAPY 1/> REGIONS: CPT

## 2021-02-01 NOTE — FLOWSHEET NOTE
[facility], [facility], [address], [address]  Phone: [phone]  Fax [phone]    Physical Therapy Daily Treatment Note    Date:  2021    Patient Name:  Kenrick Sow    :  1995  MRN: [id number]  Restrictions/Precautions:    Medical/Treatment Diagnosis Information:  Diagnosis: M22.2X1, M22.2X2 (ICD-10-CM) - Patellofemoral syndrome of both knees  Insurance/Certification information:  PT Insurance Information: [email or web address]  Physician Information:  Referring Practitioner: BRIONNA James  Plan of care signed (Y/N):  Y  Visit# / total visits:       G-Code (if applicable): Modified Oswestry:     Time in:   13:30    Timed Treatment: 45 Total Treatment Time:  45  Time out: 14:15  ________________________________________________________________________________________    Pain Level:    1-210  SUBJECTIVE:  Pt reported that she has a lot going on. Pt has switched jobs and helped move her mother from [place]. No huge increase in soreness in the back. Pt is a little sore. Pt L hip has been more sore.      OBJECTIVE:     Exercise/Equipment Resistance/Repetitions Other comments   Prone hip extension   PKF c ball   x10     Bridges on ball  5\"x15     Clamshell 2     TA     -BKFO     -Marches    x10B  A10P     Magnetic Click  70\"E50    Multifidus punch      Multifidus punch  x15B    Dowel michaela training x15 seated     Lateral chain  Posterior chain x10B  x10B    Stacking  2x30\" B    Hip abduction machine  Hip extension machine x15B  x15B 15#  40#    TG  5 min                                                                                                                   Other Therapeutic Activities:      Manual Treatments:         Modalities:      Test/Measurements:    RLE LAD/compression grade 3-4  RLE neuro re-ed   L hip hit technique   Sidelying ER mob LLE   STM to L piriformis and glutes   Lumbar gapping L Prone PA mobs  Lumbar skin rolling       ASSESSMENT:  Pt tolerated treatment well as pt performed all manual and TE with no increase in pain. Core and hip strengthening progressed this date. HEP updated. Pt will progress as pt can tolerate. Treatment/Activity Tolerance:   [x]Patient tolerated treatment well [] Patient limited by fatique  []Patient limited by pain [] Patient limited by other medical complications  [] Other:     Goals:        Long term goals  Time Frame for Long term goals : 6 weeks  Long term goal 1: Pt will be independent and compliant with HEP  Long term goal 2: Pt will improve bilateral hip strength to 4/5 gross strength  Long term goal 3: Pt will report at least 50% improvement in frequency and intensity of back/hip pain  Long term goal 4: Pt will ambulate will proper hip extension and no sign of trendelenburg bilaterally  Long term goal 5: Pt will improve lumbopelvic strength to 4/5     Plan: [x] Continue per plan of care [] Alter current plan (see comments)   [] Plan of care initiated [] Hold pending MD visit [] Discharge      Plan for Next Session:      Re-Certification Due Date:         Signature:   Krishna Batres , PT

## 2021-02-03 ENCOUNTER — HOSPITAL ENCOUNTER (OUTPATIENT)
Dept: PHYSICAL THERAPY | Age: 26
Setting detail: THERAPIES SERIES
Discharge: HOME | End: 2021-02-03
Payer: COMMERCIAL

## 2021-02-03 PROCEDURE — 97110 THERAPEUTIC EXERCISES: CPT

## 2021-02-03 PROCEDURE — 97140 MANUAL THERAPY 1/> REGIONS: CPT

## 2021-02-03 NOTE — FLOWSHEET NOTE
[facility], [facility], [address], [address]  Phone: [phone]  Fax [phone]    Physical Therapy Daily Treatment Note    Date:  2/3/2021    Patient Name:  Abhishek Muhammad    :  1995  MRN: [id number]  Restrictions/Precautions:    Medical/Treatment Diagnosis Information:  Diagnosis: M22.2X1, M22.2X2 (ICD-10-CM) - Patellofemoral syndrome of both knees  Insurance/Certification information:  PT Insurance Information: [employer]  Physician Information:  Referring Practitioner: BRIONNA Urena  Plan of care signed (Y/N):  Y  Visit# / total visits:       G-Code (if applicable):           Modified Oswestry:     Time in:   8:20   Timed Treatment: 40 Total Treatment Time:  40  Time out: 9:00  ________________________________________________________________________________________    Pain Level:    1-2/10  SUBJECTIVE:  Pt reported that she has been feeling good the past two days     OBJECTIVE:     Exercise/Equipment Resistance/Repetitions Other comments   Prone hip extension   PKF c ball x10B  x10 2#    Bridges on ball       Clamshell 2     TA     -BKFO     -Marches     -heel slides     x10B  x10B   H61R    Magnetic Click  09\"U35    Multifidus punch      Multifidus punch  x15B    Dowel michaela training     Lateral chain  Posterior chain x10B  x10B    Stacking      Hip abduction machine  Hip extension machine x15B  x15B 15#  40# Pain with LLE stance     TG  5 min              Balance board nv      Hip extension with posterior anchor  nv                                                                                             Other Therapeutic Activities:      Manual Treatments:         Modalities:      Test/Measurements:    RLE LAD/compression grade 3-4  RLE neuro re-ed   L hip hit technique   Sidelying ER mob LLE   STM to L piriformis and glutes   Lumbar gapping L   Prone PA mobs         ASSESSMENT:  Pt tolerated treatment well as pt performed all manual and TE with no increase in pain. Core and hip strengthening progressed this date. HEP updated. Pt will progress as pt can tolerate. Treatment/Activity Tolerance:   [x]Patient tolerated treatment well [] Patient limited by fatique  []Patient limited by pain [] Patient limited by other medical complications  [] Other:     Goals:        Long term goals  Time Frame for Long term goals : 6 weeks  Long term goal 1: Pt will be independent and compliant with HEP  Long term goal 2: Pt will improve bilateral hip strength to 4/5 gross strength  Long term goal 3: Pt will report at least 50% improvement in frequency and intensity of back/hip pain  Long term goal 4: Pt will ambulate will proper hip extension and no sign of trendelenburg bilaterally  Long term goal 5: Pt will improve lumbopelvic strength to 4/5     Plan: [x] Continue per plan of care [] Alter current plan (see comments)   [] Plan of care initiated [] Hold pending MD visit [] Discharge      Plan for Next Session:      Re-Certification Due Date:         Signature:   Elena Jacob PT

## 2021-02-08 ENCOUNTER — HOSPITAL ENCOUNTER (OUTPATIENT)
Dept: PHYSICAL THERAPY | Age: 26
Setting detail: THERAPIES SERIES
Discharge: HOME | End: 2021-02-08
Payer: COMMERCIAL

## 2021-02-08 PROCEDURE — 97110 THERAPEUTIC EXERCISES: CPT

## 2021-02-08 PROCEDURE — 97140 MANUAL THERAPY 1/> REGIONS: CPT

## 2021-02-08 NOTE — FLOWSHEET NOTE
ASSESSMENT:  Pt tolerated treatment well as pt performed all manual and TE with no increase in pain. Core and hip strengthening progressed this date. HEP updated. Pt will progress as pt can tolerate. Treatment/Activity Tolerance:   [x]Patient tolerated treatment well [] Patient limited by fatique  []Patient limited by pain [] Patient limited by other medical complications  [] Other:     Goals:        Long term goals  Time Frame for Long term goals : 6 weeks  Long term goal 1: Pt will be independent and compliant with HEP  Long term goal 2: Pt will improve bilateral hip strength to 4/5 gross strength  Long term goal 3: Pt will report at least 50% improvement in frequency and intensity of back/hip pain  Long term goal 4: Pt will ambulate will proper hip extension and no sign of trendelenburg bilaterally  Long term goal 5: Pt will improve lumbopelvic strength to 4/5     Plan: [x] Continue per plan of care [] Alter current plan (see comments)   [] Plan of care initiated [] Hold pending MD visit [] Discharge      Plan for Next Session:      Re-Certification Due Date:         Signature:   Sona Pastrana , PT

## 2021-02-10 ENCOUNTER — HOSPITAL ENCOUNTER (OUTPATIENT)
Dept: PHYSICAL THERAPY | Age: 26
Setting detail: THERAPIES SERIES
Discharge: HOME | End: 2021-02-10
Payer: COMMERCIAL

## 2021-02-10 PROCEDURE — 97110 THERAPEUTIC EXERCISES: CPT

## 2021-02-10 PROCEDURE — 97140 MANUAL THERAPY 1/> REGIONS: CPT

## 2021-02-10 NOTE — FLOWSHEET NOTE
[facility], [facility], [address], [address]  Phone: [phone]  Fax [phone]    Physical Therapy Daily Treatment Note    Date:  2/10/2021    Patient Name:  Jose Ramirez    :  1995  MRN: [id number]  Restrictions/Precautions:    Medical/Treatment Diagnosis Information:  Diagnosis: M22.2X1, M22.2X2 (ICD-10-CM) - Patellofemoral syndrome of both knees  Insurance/Certification information:  PT Insurance Information: Javier  Physician Information:  Referring Practitioner: BRIONNA Rodarte  Plan of care signed (Y/N):  Y  Visit# / total visits:       G-Code (if applicable): Modified Oswestry:     Time in:   9:00   Timed Treatment: 45 Total Treatment Time:  45  Time out: 9:45  ________________________________________________________________________________________    Pain Level:    1-2/10  SUBJECTIVE:  Pt reported she did a lot of shoveling yesterday.  Pt was more sore but soreness is going down     OBJECTIVE:     Exercise/Equipment Resistance/Repetitions Other comments   Prone hip extension   PKF c ball x15B  x10 2#    Bridges on ball       Clamshell 2     TA     -BKFO     -Marches     -heel slides     x10B  x10B   L79B    Magnetic Click           Multifidus punch  x15B    Dowel michaela training     Lateral chain  Posterior chain     Stacking      Hip abduction machine  Hip extension machine  15#  45# Pain with LLE stance     TG  5 min       Lateral band walking 5 laps  Green      Balance board 1 min Bal ea dir   1 min tap ea dir    BOSU stand  BOSU mini squat  1 min  x15       Hip extension with posterior anchor  nv      3-way hip  x10 ea direction B                TG + MH  5 min                                                                       Other Therapeutic Activities:      Manual Treatments:         Modalities:      Test/Measurements:    RLE LAD/compression grade 3-4  RLE neuro re-ed   L hip hit technique   Sidelying ER mob LLE   STM to L piriformis and glutes   Lumbar gapping L   Prone PA mobs         ASSESSMENT:  Pt tolerated treatment well as pt performed all manual and TE with no increase in pain. Core and hip strengthening progressed this date. HEP updated. Pt will progress as pt can tolerate. Treatment/Activity Tolerance:   [x]Patient tolerated treatment well [] Patient limited by fatique  []Patient limited by pain [] Patient limited by other medical complications  [] Other:     Goals:        Long term goals  Time Frame for Long term goals : 6 weeks  Long term goal 1: Pt will be independent and compliant with HEP  Long term goal 2: Pt will improve bilateral hip strength to 4/5 gross strength  Long term goal 3: Pt will report at least 50% improvement in frequency and intensity of back/hip pain  Long term goal 4: Pt will ambulate will proper hip extension and no sign of trendelenburg bilaterally  Long term goal 5: Pt will improve lumbopelvic strength to 4/5     Plan: [x] Continue per plan of care [] Alter current plan (see comments)   [] Plan of care initiated [] Hold pending MD visit [] Discharge      Plan for Next Session:      Re-Certification Due Date:         Signature:   Isabel Page , PT

## 2021-02-16 ENCOUNTER — HOSPITAL ENCOUNTER (OUTPATIENT)
Dept: PHYSICAL THERAPY | Age: 26
Setting detail: THERAPIES SERIES
Discharge: HOME | End: 2021-02-16
Payer: COMMERCIAL

## 2021-02-16 PROCEDURE — 97140 MANUAL THERAPY 1/> REGIONS: CPT

## 2021-02-16 PROCEDURE — 97110 THERAPEUTIC EXERCISES: CPT

## 2021-02-16 NOTE — FLOWSHEET NOTE
[facility], [place] [address] Jony[address]ari Mccarthyjaspal  9 [facility], [address] [place] [address]  Phone: [phone]  Fax [phone]    Physical Therapy Daily Treatment Note    Date:  2021    Patient Name:  Ana Espinal    :  1995  MRN: [id number]  Restrictions/Precautions:    Medical/Treatment Diagnosis Information:  Diagnosis: M22.2X1, M22.2X2 (ICD-10-CM) - Patellofemoral syndrome of both knees  Insurance/Certification information:  PT Insurance Information: Javier  Physician Information:  Referring Practitioner: BRIONNA Irvin  Plan of care signed (Y/N):  Y  Visit# / total visits:       G-Code (if applicable): Modified Oswestry: 50    Time in:   13:50  Timed Treatment: 50 Total Treatment Time:  50  Time out: 14:40  ________________________________________________________________________________________    Pain Level:    1-210  SUBJECTIVE:  Pt reported she is feeling pretty good.  No new issues to reprot     OBJECTIVE:     Exercise/Equipment Resistance/Repetitions Other comments   Prone hip extension   PKF c ball x15B  x10 3#    Bridges on ball       Clamshell 2     TA     -BKFO     -Marches     -heel slides     x10B  x10B   D69B    Magnetic Click           Multifidus punch  x15B    Dowel michaela training     Lateral chain  Posterior chain     Stacking      Hip abduction machine  Hip extension machine  15#  45# Pain with LLE stance     TG  5 min       Lateral band walking 5 laps  Green      Balance board 1 min Bal ea dir   1 min tap ea dir    BOSU stand  BOSU mini squat  1 min  x15     SLS on foam  1 min LLE      FSU with posterior anchor  LSD with lateral anchor  x15B  x15B      3-way hip  x10 ea direction B                TG + MH  5 min                                                                       Other Therapeutic Activities:      Manual Treatments:         Modalities:      Test/Measurements:    RLE LAD/compression grade 3-4  RLE neuro re-ed   L hip hit technique   Sidelying ER mob LLE   STM to L piriformis and glutes   Lumbar gapping L   Prone PA mobs         ASSESSMENT:  Pt tolerated treatment well as pt performed all manual and TE with no increase in pain. Core and hip strengthening progressed this date. HEP updated. Pt will progress as pt can tolerate. Treatment/Activity Tolerance:   [x]Patient tolerated treatment well [] Patient limited by fatique  []Patient limited by pain [] Patient limited by other medical complications  [] Other:     Goals:        Long term goals  Time Frame for Long term goals : 6 weeks  Long term goal 1: Pt will be independent and compliant with HEP  Long term goal 2: Pt will improve bilateral hip strength to 4/5 gross strength  Long term goal 3: Pt will report at least 50% improvement in frequency and intensity of back/hip pain  Long term goal 4: Pt will ambulate will proper hip extension and no sign of trendelenburg bilaterally  Long term goal 5: Pt will improve lumbopelvic strength to 4/5     Plan: [x] Continue per plan of care [] Alter current plan (see comments)   [] Plan of care initiated [] Hold pending MD visit [] Discharge      Plan for Next Session:      Re-Certification Due Date:         Signature:   Torsten Nino , PT

## 2021-02-18 ENCOUNTER — HOSPITAL ENCOUNTER (OUTPATIENT)
Dept: PHYSICAL THERAPY | Age: 26
Setting detail: THERAPIES SERIES
Discharge: HOME | End: 2021-02-18
Payer: COMMERCIAL

## 2021-02-18 PROCEDURE — 97110 THERAPEUTIC EXERCISES: CPT

## 2021-02-18 PROCEDURE — 97140 MANUAL THERAPY 1/> REGIONS: CPT

## 2021-02-18 NOTE — FLOWSHEET NOTE
[facility], [facility], [address], [address]  Phone: [phone]  Fax [phone]    Physical Therapy Daily Treatment Note    Date:  2021    Patient Name:  Lizbet Chang    :  1995  MRN: [id number]  Restrictions/Precautions:    Medical/Treatment Diagnosis Information:  Diagnosis: M22.2X1, M22.2X2 (ICD-10-CM) - Patellofemoral syndrome of both knees  Insurance/Certification information:  PT Insurance Information: Thinker Thingna  Physician Information:  Referring Practitioner: BRIONNA Barker  Plan of care signed (Y/N):  Y  Visit# / total visits:  10/12     G-Code (if applicable):           Modified Oswestry:     Time in:   12:25  Timed Treatment: 45 Total Treatment Time:  45  Time out: 13:10  ________________________________________________________________________________________    Pain Level:    1-210  SUBJECTIVE:  Pt reported she has a little muscle soreness     OBJECTIVE:     Exercise/Equipment Resistance/Repetitions Other comments   Prone hip extension   PKF c ball x15B  x10 3#    Bridges on ball       Clamshell 2     TA     -BKFO     -Marches     -heel slides     x10B  x10B   B70I    Magnetic Click           Multifidus punch      Dowel michaela training     Lateral chain  Posterior chain     Stacking      Hip abduction machine  Hip extension machine  15#  45# Pain with LLE stance     TG  5 min       Lateral band walking 5 laps  Green      Balance board 1 min Bal ea dir   1 min tap ea dir    BOSU stand  BOSU mini squat  1 min  x15     SLS on foam  1 min LLE      FSU with posterior anchor  LSD with lateral anchor  x15B  x15B      3-way hip  x10 ea direction B                TG + MH  5 min                                                                       Other Therapeutic Activities:      Manual Treatments:         Modalities:      Test/Measurements:    RLE LAD/compression grade 3-4  RLE neuro re-ed   L hip hit technique   Sidelying ER mob LLE   STM to L piriformis and glutes   Lumbar gapping L   Prone PA mobs         ASSESSMENT:  Pt tolerated treatment well as pt performed all manual and TE with no increase in pain. Core and hip strengthening progressed this date. HEP updated. Pt will progress as pt can tolerate. Treatment/Activity Tolerance:   [x]Patient tolerated treatment well [] Patient limited by fatique  []Patient limited by pain [] Patient limited by other medical complications  [] Other:     Goals:        Long term goals  Time Frame for Long term goals : 6 weeks  Long term goal 1: Pt will be independent and compliant with HEP  Long term goal 2: Pt will improve bilateral hip strength to 4/5 gross strength  Long term goal 3: Pt will report at least 50% improvement in frequency and intensity of back/hip pain  Long term goal 4: Pt will ambulate will proper hip extension and no sign of trendelenburg bilaterally  Long term goal 5: Pt will improve lumbopelvic strength to 4/5     Plan: [x] Continue per plan of care [] Alter current plan (see comments)   [] Plan of care initiated [] Hold pending MD visit [] Discharge      Plan for Next Session:      Re-Certification Due Date:         Signature:   Sona Pastrana , PT

## 2021-02-23 ENCOUNTER — HOSPITAL ENCOUNTER (OUTPATIENT)
Dept: PHYSICAL THERAPY | Age: 26
Setting detail: THERAPIES SERIES
Discharge: HOME | End: 2021-02-23
Payer: COMMERCIAL

## 2021-02-23 NOTE — PROGRESS NOTES
Physical Therapy        Physical Therapy  Cancellation/No-show Note  Patient Name:  Devra Essex  :  1995   Date:  2021  Cancels to date: 1  No-shows to date: 0    For today's appointment patient:  [x] Cancelled  [x] Rescheduled appointment  [] No-show     Reason given by patient:  [] Patient ill  [] Conflicting appointment  [] No transportation    [] Conflict with work  [] No reason given  [x] Other:     Comments:  Pt called and reported that she was exposed to someone who tested positive for Covid    Electronically signed by:  Lokesh Mac PT

## 2021-02-25 ENCOUNTER — APPOINTMENT (OUTPATIENT)
Dept: PHYSICAL THERAPY | Age: 26
End: 2021-02-25
Payer: COMMERCIAL

## 2021-03-02 ENCOUNTER — HOSPITAL ENCOUNTER (OUTPATIENT)
Dept: PHYSICAL THERAPY | Age: 26
Setting detail: THERAPIES SERIES
Discharge: HOME | End: 2021-03-02

## 2021-03-02 PROCEDURE — 97110 THERAPEUTIC EXERCISES: CPT

## 2021-03-02 PROCEDURE — 97140 MANUAL THERAPY 1/> REGIONS: CPT

## 2021-03-02 NOTE — FLOWSHEET NOTE
[facility], [facility], [address], [address]  Phone: [phone]  Fax [phone]    Physical Therapy Daily Treatment Note    Date:  3/2/2021    Patient Name:  Xin Whyte    :  1995  MRN: [id number]  Restrictions/Precautions:    Medical/Treatment Diagnosis Information:  Diagnosis: M22.2X1, M22.2X2 (ICD-10-CM) - Patellofemoral syndrome of both knees  Insurance/Certification information:  PT Insurance Information: Kentonna  Physician Information:  Referring Practitioner: BRIONNA Hutton  Plan of care signed (Y/N):  Y  Visit# / total visits:       G-Code (if applicable): Modified Oswestry:     Time in:   13:48  Timed Treatment: 42 Total Treatment Time:  45  Time out: 13:33  ________________________________________________________________________________________    Pain Level:    1-2/10  SUBJECTIVE:  Pt said two  ago pt had some increased pain when moving her moms stuff in. Pt said by Monday hip pain went down.      OBJECTIVE:     Exercise/Equipment Resistance/Repetitions Other comments   Prone hip extension   PKF c ball x15B  x10 3#    Bridges on ball  5\"x15      Clamshell 2     TA     -BKFO     -Marches     -heel slides     x10B  x10B   A78A    Magnetic Click      DKTC G56    Multifidus punch      Dowel michaela training     Lateral chain  Posterior chain     Stacking      Hip abduction machine  Hip extension machine  15#  45# Pain with LLE stance     TG  5 min       Lateral band walking 5 laps  Green      Balance board 1 min Bal ea dir   1 min tap ea dir    BOSU stand  BOSU mini squat  1 min  x15     SLS on foam      FSU with posterior anchor  LSD with lateral anchor       3-way hip       Single leg ER on wall  2x30\"        TG + MH  5 min                                                                       Other Therapeutic Activities:      Manual Treatments:         Modalities: Test/Measurements:    RLE LAD/compression grade 3-4  RLE neuro re-ed   L hip hit technique   Sidelying ER mob LLE   STM to L piriformis and glutes   Lumbar gapping L   Prone PA mobs  Inferior and lateral L hip mobs with belt          ASSESSMENT:  Pt tolerated treatment well as pt performed all manual and TE with no increase in pain. Core and hip strengthening progressed this date. HEP updated. No increase in hip pain during session  Pt will progress as pt can tolerate. Treatment/Activity Tolerance:   [x]Patient tolerated treatment well [] Patient limited by fatique  []Patient limited by pain [] Patient limited by other medical complications  [] Other:     Goals:        Long term goals  Time Frame for Long term goals : 6 weeks  Long term goal 1: Pt will be independent and compliant with HEP  Long term goal 2: Pt will improve bilateral hip strength to 4/5 gross strength  Long term goal 3: Pt will report at least 50% improvement in frequency and intensity of back/hip pain  Long term goal 4: Pt will ambulate will proper hip extension and no sign of trendelenburg bilaterally  Long term goal 5: Pt will improve lumbopelvic strength to 4/5     Plan: [x] Continue per plan of care [] Alter current plan (see comments)   [] Plan of care initiated [] Hold pending MD visit [] Discharge      Plan for Next Session:      Re-Certification Due Date:         Signature:   Cleavon Friday , PT

## 2021-03-04 ENCOUNTER — HOSPITAL ENCOUNTER (OUTPATIENT)
Dept: PHYSICAL THERAPY | Age: 26
Setting detail: THERAPIES SERIES
Discharge: HOME | End: 2021-03-04

## 2021-03-04 PROCEDURE — 97110 THERAPEUTIC EXERCISES: CPT

## 2021-03-04 PROCEDURE — 97140 MANUAL THERAPY 1/> REGIONS: CPT

## 2021-03-04 NOTE — FLOWSHEET NOTE
[facility] [place] [address] [address], [address]  Phone: [phone]  Fax [phone]    Physical Therapy Daily Treatment Note    Date:  3/4/2021    Patient Name:  Laura Ann    :  1995  MRN: [id number]  Restrictions/Precautions:    Medical/Treatment Diagnosis Information:  Diagnosis: M22.2X1, M22.2X2 (ICD-10-CM) - Patellofemoral syndrome of both knees  Insurance/Certification information:  PT Insurance Information: Javier  Physician Information:  Referring Practitioner: BRIONNA Anguiano  Plan of care signed (Y/N):  Y  Visit# / total visits:       G-Code (if applicable):           Modified Oswestry:   Modified Oswestry:     Time in:   9:17  Timed Treatment: 43 Total Treatment Time:  48  Time out: 10:03  ________________________________________________________________________________________    Pain Level:    1-210  SUBJECTIVE:  Pt said she still has mild soreness in L hip     OBJECTIVE:     Exercise/Equipment Resistance/Repetitions Other comments   Prone hip extension   PKF c ball x15B  x10 3#    Bridges on ball  5\"x15      Clamshell 2     TA     -BKFO     -Marches     -heel slides         Magnetic Click      DKTC I54    Multifidus punch      Dowel michaela training     Lateral chain  Posterior chain     Stacking      Hip abduction machine  Hip extension machine  15#  45# Pain with LLE stance     TG  5 min       Lateral band walking 5 laps  Green      Balance board 1 min Bal ea dir   1 min tap ea dir    BOSU stand  BOSU mini squat  1 min  x15     SLS on foam      FSU with posterior anchor  LSD with lateral anchor       3-way hip       Single leg ER on wall  2x30\"        TG + MH          Leg press 5 min   x20                                                                      Other Therapeutic Activities:      Manual Treatments:         Modalities:      Test/Measurements:    RLE LAD/compression grade 3-4  RLE Date:         Signature:   Shiraz Herrera , PT

## 2021-03-04 NOTE — PROGRESS NOTES
Physical Therapy        Outpatient Physical Therapy  Phone: [phone] Fax: [phone]     To: BRIONNA Ambriz     From: Mayra Ledezma PT     Patient: Khushbu Cisneros       : 1995  Diagnosis: M22.2X1, M22.2X2 (ICD-10-CM) - Patellofemoral syndrome of both knees, M25.561, M25.562, G89.29 (ICD-10-CM) - Chronic pain of both knees   Date: 3/4/2021  Treatment Diagnosis:  Back Pain, Hip Pain, Knee Pain     Physical Therapy Progress Note    Total Visits to date:    Cancels/No-shows to date:  1 cancel     Plan of Care/Treatment to date:  [x] Therapeutic Exercise    [x] Modalities:  [x] Therapeutic Activity     [] Ultrasound   [] Electrical Stimulation   [] Gait Training      [] Cervical Traction   [] Lumbar Traction  [x] Neuromuscular Re-education  [] Cold/hotpack  [] Iontophoresis  [x] Instruction in HEP      Other:  [x] Manual Therapy       []                                 [] Aquatic Therapy       []                                     Significant Findings At Last Visit/Comments:     Pt has made progress this treatment period as pt has achieved 3/5 established goals and is making progress toward remaining 2 goals. Pt has reported compliance and independence with HEP. Pt has Improved BLE strength to 4/5. However, pt continues to demonstrate mild hip abduction weakness (4-/5). Pt has reported significant improvement in back pain, but continues to feel moderate hip pain when doing physical activity. Pt can now consistently ambulate with improved hip extension and no sign of trendelenburg bilaterally. Pt has improved lumbopelvic strength to 4-/5. PT recommending pt continue to perform physical therapy 2x/week for 4 weeks in order to continue to improve hip strength, core strength, decrease pain in hip, and to maximize function at home and in the community.         Progress towards goals:    Long term goals  Time Frame for Long term goals : 6 weeks  Long term goal 1: Pt will be independent and compliant with HEP (Goal Met)   Long term goal 2: Pt will improve bilateral hip strength to 4/5 gross strength (Progressing)  Long term goal 3: Pt will report at least 50% improvement in frequency and intensity of back/hip pain (Goal Met- 60% - continues to have hip pain)  Long term goal 4: Pt will ambulate will proper hip extension and no sign of trendelenburg bilaterally (Goal Met)  Long term goal 5: Pt will improve lumbopelvic strength to 4/5 (Progressing)    Frequency/Duration:  # Days per week: [] 1 day # Weeks: [] 1 week [] 4 weeks      [x] 2 days   [] 2 weeks [] 5 weeks     [] 3 days   [] 3 weeks [] 6 weeks     Rehab Potential: [] Excellent [x] Good [] Fair  [] Poor     Goal Status:  [] Achieved [x] Partially Achieved  [] Not Achieved     Patient Status: [] Continue per initial plan of Care     [] Patient now discharged     [x] Additional visits requested, Please re-certify for additional visits:      Requested frequency/duration:  2X/week for 4 weeks    Electronically signed by:  Sona Pastrana PT    If you have any questions or concerns, please don't hesitate to call.   Thank you for your referral.    Physician Signature:________________________________Date:__________________  By signing above, therapists plan is approved by physician

## 2021-03-09 ENCOUNTER — HOSPITAL ENCOUNTER (OUTPATIENT)
Dept: PHYSICAL THERAPY | Age: 26
Setting detail: THERAPIES SERIES
Discharge: HOME | End: 2021-03-09

## 2021-03-09 PROCEDURE — 97110 THERAPEUTIC EXERCISES: CPT

## 2021-03-09 PROCEDURE — 97140 MANUAL THERAPY 1/> REGIONS: CPT

## 2021-03-09 NOTE — FLOWSHEET NOTE
Other Therapeutic Activities:      Manual Treatments:         Modalities:      Test/Measurements:    RLE LAD/compression grade 3-4  RLE neuro re-ed   L hip hit technique   Sidelying ER mob LLE   STM to L piriformis and glutes   Lumbar gapping L   Prone PA mobs  Inferior and lateral L hip mobs with belt          ASSESSMENT:  Pt has made progress this treatment period as pt has achieved 3/5 established goals and is making progress toward remaining 2 goals. Pt has reported compliance and independence with HEP. Pt has Improved BLE strength to 4/5. However, pt continues to demonstrate mild hip abduction weakness (4-/5). Pt has reported significant improvement in back pain, but continues to feel moderate hip pain when doing physical activity. Pt can now consistently ambulate with improved hip extension and no sign of trendelenburg bilaterally. Pt has improved lumbopelvic strength to 4-/5. PT recommending pt continue to perform physical therapy 2x/week for 4 weeks in order to continue to improve hip strength, core strength, decrease pain in hip, and to maximize function at home and in the community.       Treatment/Activity Tolerance:   [x]Patient tolerated treatment well [] Patient limited by fatique  []Patient limited by pain [] Patient limited by other medical complications  [] Other:     Goals:        Long term goals  Time Frame for Long term goals : 6 weeks  Long term goal 1: Pt will be independent and compliant with HEP (Goal Met)   Long term goal 2: Pt will improve bilateral hip strength to 4/5 gross strength (Progressing)  Long term goal 3: Pt will report at least 50% improvement in frequency and intensity of back/hip pain (Goal Met- 60% - continues to have hip pain)  Long term goal 4: Pt will ambulate will proper hip extension and no sign of trendelenburg bilaterally (Goal Met)  Long term goal 5: Pt will improve lumbopelvic strength to 4/5 (Progressing)    Plan: [x] Continue per plan of care [] Alter

## 2021-03-12 ENCOUNTER — HOSPITAL ENCOUNTER (OUTPATIENT)
Dept: PHYSICAL THERAPY | Age: 26
Setting detail: THERAPIES SERIES
Discharge: HOME | End: 2021-03-12

## 2021-03-12 PROCEDURE — 97110 THERAPEUTIC EXERCISES: CPT

## 2021-03-12 PROCEDURE — 97140 MANUAL THERAPY 1/> REGIONS: CPT

## 2021-03-12 NOTE — FLOWSHEET NOTE
[facility], [facility], [address], [address]  Phone: [phone]  Fax [phone]    Physical Therapy Daily Treatment Note    Date:  3/12/2021    Patient Name:  Janay Catalan    :  1995  MRN: [id number]  Restrictions/Precautions:    Medical/Treatment Diagnosis Information:  Diagnosis: M22.2X1, M22.2X2 (ICD-10-CM) - Patellofemoral syndrome of both knees  Insurance/Certification information:  PT Insurance Information: [employer]  Physician Information:  Referring Practitioner: BRIONNA Kulkarni  Plan of care signed (Y/N):  Y  Visit# / total visits:      G-Code (if applicable): Modified Oswestry:   Modified Oswestry:     Time in:   15:00  Timed Treatment: 49 Total Treatment Time:  49  Time out: 15:49  ________________________________________________________________________________________    Pain Level:    1-210  SUBJECTIVE:  Pt said she she was in a meeting this morning and had mild increased hip pain.      OBJECTIVE:     Exercise/Equipment Resistance/Repetitions Other comments   Prone hip extension   PKF c ball x15B  x10 3#    Bridges on ball c hamstring 5\"x15      Clamshell 2     GLute matrix x15B 2#   TA     -BKFO     -Marches     -heel slides         Magnetic Click      Hip matrix   Forward facing hip matrix  x10 ea  x10 ea     DKTC     Multifidus punch      Dowel michaela training     Lateral chain  Posterior chain     Stacking      Hip abduction machine  Hip extension machine  15#  45# Pain with LLE stance     TG  5 min       Lateral band walking 5 laps  Green      Balance board 1 min Bal ea dir   1 min tap ea dir    BOSU stand  BOSU mini squat  1 min  x15     SLS on foam      FSU with posterior anchor  LSD with lateral anchor       3-way hip       Single leg ER on wall  2x30\"        TG + MH          walking         Leg press 5 min   x25  x20       Kneeling hip extension with posterior anchor and BUE extension x15      Lunges with anterior anchor x10B yellow                                                      Other Therapeutic Activities:      Manual Treatments:         Modalities:      Test/Measurements:    RLE LAD/compression grade 3-4  RLE neuro re-ed   L hip hit technique   Sidelying ER mob LLE   STM to L piriformis and glutes   Lumbar gapping L   Prone PA mobs  Inferior and lateral L hip mobs with belt   Joe stretch L quad  Supine quad stretch with LLE off table          ASSESSMENT:  Pt tolerated treatment well with no increase in hip or back pain during all TE. Hip mobility and strengthening exercises progressed this date. HEP updated. Pt will progress TE as pt can tolerate. Treatment/Activity Tolerance:   [x]Patient tolerated treatment well [] Patient limited by fatique  []Patient limited by pain [] Patient limited by other medical complications  [] Other:     Goals:        Long term goals  Time Frame for Long term goals : 6 weeks  Long term goal 1: Pt will be independent and compliant with HEP (Goal Met)   Long term goal 2: Pt will improve bilateral hip strength to 4/5 gross strength (Progressing)  Long term goal 3: Pt will report at least 50% improvement in frequency and intensity of back/hip pain (Goal Met- 60% - continues to have hip pain)  Long term goal 4: Pt will ambulate will proper hip extension and no sign of trendelenburg bilaterally (Goal Met)  Long term goal 5: Pt will improve lumbopelvic strength to 4/5 (Progressing)    Plan: [x] Continue per plan of care [] Alter current plan (see comments)   [] Plan of care initiated [] Hold pending MD visit [] Discharge      Plan for Next Session:      Re-Certification Due Date:         Signature:   Hernandez Sebastian PT

## 2021-03-17 ENCOUNTER — HOSPITAL ENCOUNTER (OUTPATIENT)
Dept: PHYSICAL THERAPY | Age: 26
Setting detail: THERAPIES SERIES
Discharge: HOME | End: 2021-03-17

## 2021-03-17 PROCEDURE — 97140 MANUAL THERAPY 1/> REGIONS: CPT

## 2021-03-17 PROCEDURE — 97110 THERAPEUTIC EXERCISES: CPT

## 2021-03-17 NOTE — FLOWSHEET NOTE
[facility], [facility], [address], [address]  Phone: [phone]  Fax [phone]    Physical Therapy Daily Treatment Note    Date:  3/17/2021    Patient Name:  Chai Rosa    :  1995  MRN: [id number]  Restrictions/Precautions:    Medical/Treatment Diagnosis Information:  Diagnosis: M22.2X1, M22.2X2 (ICD-10-CM) - Patellofemoral syndrome of both knees  Insurance/Certification information:  PT Insurance Information: [employer]  Physician Information:  Referring Practitioner: BRIONNA Grider  Plan of care signed (Y/N):  Y  Visit# / total visits:  12/12 3/8    G-Code (if applicable): Modified Oswestry:   Modified Oswestry:     Time in:   8:20  Timed Treatment: 44 Total Treatment Time:  44  Time out: 9:04  ________________________________________________________________________________________    Pain Level:    1-210  SUBJECTIVE:  Pt said she felt okay over the weekend. No big issues.  Continues to have mild pain if sitting or standing too long     OBJECTIVE:     Exercise/Equipment Resistance/Repetitions Other comments   Prone hip extension   PKF c ball  3#    Bridges on ball c hamstring 5\"x15      Clamshell 2     GLute matrix x15B 2#   TA     -BKFO     -Marches     -heel slides         Magnetic Click      Hip matrix   Forward facing hip matrix  x10 ea  x10 ea     DKTC     Multifidus punch      Dowel michaela training     Lateral chain  Posterior chain     Stacking      Hip abduction machine  Hip extension machine  15#  45# Pain with LLE stance     TG         Lateral band walking 5 laps  Green      Balance board 1 min Bal ea dir   1 min tap ea dir    BOSU stand  BOSU mini squat  1 min  x15     SLS on foam      FSU with posterior anchor  LSD with lateral anchor       3-way hip       Single leg ER on wall  2x30\"        TG + MH          walking         Leg press 5 min   x25  x20       Kneeling hip extension with posterior anchor and BUE extension      Lunges with anterior anchor yellow                                                      Other Therapeutic Activities:      Manual Treatments:         Modalities:      Test/Measurements:    RLE LAD/compression grade 3-4  RLE neuro re-ed   L hip hit technique   Sidelying ER mob LLE   STM to L piriformis and glutes   Lumbar gapping L   Prone PA mobs  Inferior and lateral L hip mobs with belt   Joe stretch L quad  Supine quad stretch with LLE off table with STM to hip flexor and Iliopsoas          ASSESSMENT:  Pt tolerated treatment well with no increase in hip or back pain during all TE. Hip mobility and strengthening exercises progressed this date. HEP updated. Pt will continue to progress TE as pt can tolerate. Treatment/Activity Tolerance:   [x]Patient tolerated treatment well [] Patient limited by fatique  []Patient limited by pain [] Patient limited by other medical complications  [] Other:     Goals:        Long term goals  Time Frame for Long term goals : 6 weeks  Long term goal 1: Pt will be independent and compliant with HEP (Goal Met)   Long term goal 2: Pt will improve bilateral hip strength to 4/5 gross strength (Progressing)  Long term goal 3: Pt will report at least 50% improvement in frequency and intensity of back/hip pain (Goal Met- 60% - continues to have hip pain)  Long term goal 4: Pt will ambulate will proper hip extension and no sign of trendelenburg bilaterally (Goal Met)  Long term goal 5: Pt will improve lumbopelvic strength to 4/5 (Progressing)    Plan: [x] Continue per plan of care [] Alter current plan (see comments)   [] Plan of care initiated [] Hold pending MD visit [] Discharge      Plan for Next Session:      Re-Certification Due Date:         Signature:   Brianna Chandler PT

## 2021-03-19 ENCOUNTER — HOSPITAL ENCOUNTER (OUTPATIENT)
Dept: PHYSICAL THERAPY | Age: 26
Setting detail: THERAPIES SERIES
Discharge: HOME | End: 2021-03-19

## 2021-03-24 ENCOUNTER — HOSPITAL ENCOUNTER (OUTPATIENT)
Dept: PHYSICAL THERAPY | Age: 26
Setting detail: THERAPIES SERIES
Discharge: HOME | End: 2021-03-24

## 2021-03-24 PROCEDURE — 97140 MANUAL THERAPY 1/> REGIONS: CPT

## 2021-03-24 PROCEDURE — 97110 THERAPEUTIC EXERCISES: CPT

## 2021-03-24 NOTE — FLOWSHEET NOTE
[facility], [facility], [address], [address]  Phone: [phone]  Fax [phone]    Physical Therapy Daily Treatment Note    Date:  3/24/2021    Patient Name:  Lizbte Chang    :  1995  MRN: [id number]  Restrictions/Precautions:    Medical/Treatment Diagnosis Information:  Diagnosis: M22.2X1, M22.2X2 (ICD-10-CM) - Patellofemoral syndrome of both knees  Insurance/Certification information:  PT Insurance Information: Cigna  Physician Information:  Referring Practitioner: BRIONNA Barker  Plan of care signed (Y/N):  Y  Visit# / total visits:      G-Code (if applicable): Modified Oswestry:   Modified Oswestry:     Time in:   8:27  Timed Treatment: 35 Total Treatment Time:  35  Time out: 9:02  ________________________________________________________________________________________    Pain Level:    1-2/10  SUBJECTIVE:  Pt said her pain is back in her hip. Not constant. Sitting a lot causes pain. Pt said sitting for 10 minutes causes increase in pain.      OBJECTIVE:     Exercise/Equipment Resistance/Repetitions Other comments   Prone hip extension   PKF c ball x15B  x15B  3#    Bridges on ball  5\"x15      Clamshell 2     Supine clam   Single leg ER x15  x10    Glute matrix x15B 3#   Sidelying hip abduction  x10B 3#   TA     -BKFO     -Marches     -heel slides         Magnetic Click      Hip matrix   Forward facing hip matrix      DKTC     Multifidus punch      Dowel michaela training     Lateral chain  Posterior chain     Stacking      Hip abduction machine  Hip extension machine  15#  45# Pain with LLE stance     TG         Lateral band walking Green      Balance board    BOSU stand  BOSU mini squat     SLS on foam      FSU with posterior anchor  LSD with lateral anchor       3-way hip       Single leg ER on wall         TG + MH          walking         Leg press       Kneeling hip extension with posterior anchor and BUE extension      Lunges with anterior anchor yellow                                                      Other Therapeutic Activities:      Manual Treatments:         Modalities:      Test/Measurements:    RLE LAD/compression grade 3-4  RLE neuro re-ed   L hip hit technique   Sidelying ER mob LLE   STM to L piriformis and glutes   Lumbar gapping L   Prone PA mobs lumbar spine   Inferior and lateral L hip mobs with belt   Joe stretch L quad  Supine quad stretch with LLE off table with STM to hip flexor and Iliopsoas          ASSESSMENT:  Pt experienced increased pain this date. Standing withheld this date with focus on manual interventions to improve hip mobility so surrounding tissue extensibility  . Treatment/Activity Tolerance:   [x]Patient tolerated treatment well [] Patient limited by fatique  []Patient limited by pain [] Patient limited by other medical complications  [] Other:     Goals:        Long term goals  Time Frame for Long term goals : 6 weeks  Long term goal 1: Pt will be independent and compliant with HEP (Goal Met)   Long term goal 2: Pt will improve bilateral hip strength to 4/5 gross strength (Progressing)  Long term goal 3: Pt will report at least 50% improvement in frequency and intensity of back/hip pain (Goal Met- 60% - continues to have hip pain)  Long term goal 4: Pt will ambulate will proper hip extension and no sign of trendelenburg bilaterally (Goal Met)  Long term goal 5: Pt will improve lumbopelvic strength to 4/5 (Progressing)    Plan: [x] Continue per plan of care [] Alter current plan (see comments)   [] Plan of care initiated [] Hold pending MD visit [] Discharge      Plan for Next Session:      Re-Certification Due Date:         Signature:   Elena Jacob PT

## 2021-03-26 ENCOUNTER — HOSPITAL ENCOUNTER (OUTPATIENT)
Dept: PHYSICAL THERAPY | Age: 26
Setting detail: THERAPIES SERIES
Discharge: HOME | End: 2021-03-26

## 2021-03-26 PROCEDURE — 97110 THERAPEUTIC EXERCISES: CPT

## 2021-03-26 PROCEDURE — 97140 MANUAL THERAPY 1/> REGIONS: CPT

## 2021-03-26 NOTE — FLOWSHEET NOTE
[facility], [place] [address] [address]  9 [facility], [address] [place] [address]  Phone: [phone]  Fax [phone]    Physical Therapy Daily Treatment Note    Date:  3/26/2021    Patient Name:  Tiffany De Santiago    :  1995  MRN: [id number]  Restrictions/Precautions:    Medical/Treatment Diagnosis Information:  Diagnosis: M22.2X1, M22.2X2 (ICD-10-CM) - Patellofemoral syndrome of both knees  Insurance/Certification information:  PT Insurance Information: Javier  Physician Information:  Referring Practitioner: BRIONNA Vásquez  Plan of care signed (Y/N):  Y  Visit# / total visits:      G-Code (if applicable): Modified Oswestry:   Modified Oswestry:   Modified Oswestry:     Time in:   8:19  Timed Treatment: 44 Total Treatment Time:  44  Time out: 9:03  ________________________________________________________________________________________    Pain Level:    1-2/10  SUBJECTIVE:  Pt said she is doing better than her last visit.  Had increased pain two days ago when she was at her house in sitting and standing for prolonged periods     OBJECTIVE:     Exercise/Equipment Resistance/Repetitions Other comments   Prone hip extension   PKF c ball x15B  x15B  3#    Bridges on ball  5\"x15      Clamshell 2     Supine clam   Single leg ER x15  x10    Glute matrix x15B 3#   Sidelying hip abduction  x15B 3#   TA     -BKFO     -Marches     -heel slides         Magnetic Click      Hip matrix   Forward facing hip matrix      DKTC     Multifidus punch      Dowel michaela training     Lateral chain  Posterior chain     Stacking      Hip abduction machine  Hip extension machine  15#  45# Pain with LLE stance     TG         Lateral band walking Green      Balance board    BOSU stand  BOSU mini squat     SLS on foam      FSU with posterior anchor  LSD with lateral anchor       3-way hip  x10 ea direction B      Single leg ER on wall         TG + MH walking         Leg press       Kneeling hip extension with posterior anchor and BUE extension      Lunges with anterior anchor yellow                                                      Other Therapeutic Activities:      Manual Treatments:         Modalities:      Test/Measurements:    RLE LAD/compression grade 3-4  RLE neuro re-ed  a  L hip hit technique   Sidelying ER mob LLE   STM to L piriformis and glutes   Lumbar gapping L   Prone PA mobs lumbar spine   Inferior and lateral L hip mobs with belt   Joe stretch L quad  Supine quad stretch with LLE off table with STM to hip flexor and Iliopsoas          ASSESSMENT:  Pt has make minimal progress over the last 5 sessions. Pt continues to report significant improvement in frequency and intensity of back pain. However, pt continues to have lingering and slightly worsening L hip pain. Pain gets worse with prolonged sitting, prolonged standing, and AP femoroacetabular mobs. Pt is able to perform all exercises in clinic with no increase in hip pain. Past sessions have been focused on improving hip mobility, surrounding soft tissue extensibility, and and hip/core strength. Pt also has a lot of stress with family issues occurring that could be contributing to increase in pain. PT recommending holding therapy and scheduling MD visit to explore potential options for L hip.  Pt can return to therapy based on finding and MD recommendation     Treatment/Activity Tolerance:   [x]Patient tolerated treatment well [] Patient limited by fatique  []Patient limited by pain [] Patient limited by other medical complications  [] Other:     Goals:        Long term goals  Time Frame for Long term goals : 6 weeks  Long term goal 1: Pt will be independent and compliant with HEP (Goal Met)   Long term goal 2: Pt will improve bilateral hip strength to 4/5 gross strength (Progressing)  Long term goal 3: Pt will report at least 50% improvement in frequency and intensity of back/hip pain (Goal Met- 60% - continues to have hip pain)  Long term goal 4: Pt will ambulate will proper hip extension and no sign of trendelenburg bilaterally (Goal Met)  Long term goal 5: Pt will improve lumbopelvic strength to 4/5 (Progressing)    Plan: [] Continue per plan of care [x] Alter current plan (see comments)   [] Plan of care initiated [] Hold pending MD visit [] Discharge      Plan for Next Session:      Re-Certification Due Date:         Signature:   Lokesh Mac , PT

## 2021-03-26 NOTE — PROGRESS NOTES
Physical Therapy        Outpatient Physical Therapy  Phone: [phone] Fax: [phone]     To: BRIONNA Ambriz     From: Mayra Ledezma PT     Patient: Khushbu Cisneros       : 1995  Diagnosis: M22.2X1, M22.2X2 (ICD-10-CM) - Patellofemoral syndrome of both knees, M25.561, M25.562, G89.29 (ICD-10-CM) - Chronic pain of both knees   Date: 3/26/2021  Treatment Diagnosis:  Back Pain, Hip Pain, Knee Pain     Physical Therapy Progress Note    Total Visits to date:    Cancels/No-shows to date:  2 cancel     Plan of Care/Treatment to date:  [x] Therapeutic Exercise    [x] Modalities:  [x] Therapeutic Activity     [] Ultrasound   [] Electrical Stimulation   [] Gait Training      [] Cervical Traction   [] Lumbar Traction  [x] Neuromuscular Re-education  [] Cold/hotpack  [] Iontophoresis  [x] Instruction in HEP      Other:  [x] Manual Therapy       []                                 [] Aquatic Therapy       []                                     Significant Findings At Last Visit/Comments:     Pt has make minimal progress over the last 5 sessions. Pt continues to report significant improvement in frequency and intensity of back pain. However, pt continues to have lingering and slightly worsening L hip pain. Pain gets worse with prolonged sitting, prolonged standing, and AP femoroacetabular mobs. Pt is able to perform all exercises in clinic with no increase in hip pain. Past sessions have been focused on improving hip mobility, surrounding soft tissue extensibility, and and hip/core strength. Pt also has a lot of stress with family issues occurring that could be contributing to increase in pain. PT recommending holding therapy and scheduling MD visit to explore potential options for L hip.  Pt can return to therapy based on finding and MD recommendation       Progress towards goals:    Long term goals  Time Frame for Long term goals : 6 weeks  Long term goal 1: Pt will be independent and compliant with HEP (Goal Met)   Long term goal 2: Pt will improve bilateral hip strength to 4/5 gross strength (Progressing)  Long term goal 3: Pt will report at least 50% improvement in frequency and intensity of back/hip pain (Goal Met- 60% - continues to have hip pain)  Long term goal 4: Pt will ambulate will proper hip extension and no sign of trendelenburg bilaterally (Goal Met)  Long term goal 5: Pt will improve lumbopelvic strength to 4/5 (Meet)  Frequency/Duration:  # Days per week: [] 1 day # Weeks: [] 1 week [] 4 weeks      [x] 2 days   [] 2 weeks [] 5 weeks     [] 3 days   [] 3 weeks [] 6 weeks     Rehab Potential: [] Excellent [x] Good [] Fair  [] Poor     Goal Status:  [] Achieved [x] Partially Achieved  [] Not Achieved     Patient Status: [x] Continue per initial plan of Care     [] Patient now discharged     [] Additional visits requested, Please re-certify for additional visits:      Requested frequency/duration:     Electronically signed by:  Jeromy Nova PT    If you have any questions or concerns, please don't hesitate to call.   Thank you for your referral.    Physician Signature:________________________________Date:__________________  By signing above, therapists plan is approved by physician

## 2021-03-31 ENCOUNTER — APPOINTMENT (OUTPATIENT)
Dept: PHYSICAL THERAPY | Age: 26
End: 2021-03-31

## 2021-04-10 ENCOUNTER — IMMUNIZATION (OUTPATIENT)
Dept: PRIMARY CARE CLINIC | Age: 26
End: 2021-04-10
Payer: COMMERCIAL

## 2021-04-10 PROCEDURE — 91301 COVID-19, MODERNA VACCINE 100MCG/0.5ML DOSE: CPT | Performed by: FAMILY MEDICINE

## 2021-04-10 PROCEDURE — 0011A COVID-19, MODERNA VACCINE 100MCG/0.5ML DOSE: CPT | Performed by: FAMILY MEDICINE

## 2021-04-12 ENCOUNTER — VIRTUAL VISIT (OUTPATIENT)
Dept: FAMILY MEDICINE CLINIC | Age: 26
End: 2021-04-12
Payer: COMMERCIAL

## 2021-04-12 DIAGNOSIS — M25.552 CHRONIC LEFT HIP PAIN: Primary | ICD-10-CM

## 2021-04-12 DIAGNOSIS — Z12.4 CERVICAL CANCER SCREENING: ICD-10-CM

## 2021-04-12 DIAGNOSIS — G89.29 CHRONIC LEFT HIP PAIN: Primary | ICD-10-CM

## 2021-04-12 DIAGNOSIS — Z80.3 FAMILY HISTORY OF BREAST CANCER: ICD-10-CM

## 2021-04-12 DIAGNOSIS — F41.1 GENERALIZED ANXIETY DISORDER: ICD-10-CM

## 2021-04-12 PROCEDURE — 99214 OFFICE O/P EST MOD 30 MIN: CPT | Performed by: PHYSICIAN ASSISTANT

## 2021-04-12 RX ORDER — PAROXETINE HYDROCHLORIDE 40 MG/1
40 TABLET, FILM COATED ORAL DAILY
Qty: 90 TABLET | Refills: 1 | Status: SHIPPED | OUTPATIENT
Start: 2021-04-12 | End: 2021-12-27

## 2021-04-12 ASSESSMENT — PATIENT HEALTH QUESTIONNAIRE - PHQ9
1. LITTLE INTEREST OR PLEASURE IN DOING THINGS: 0
SUM OF ALL RESPONSES TO PHQ9 QUESTIONS 1 & 2: 1
SUM OF ALL RESPONSES TO PHQ QUESTIONS 1-9: 1
SUM OF ALL RESPONSES TO PHQ QUESTIONS 1-9: 1
2. FEELING DOWN, DEPRESSED OR HOPELESS: 1

## 2021-04-12 NOTE — PROGRESS NOTES
findings-     ASSESSMENT/PLAN:  1. Generalized anxiety disorder  -  Continue with current. Call if symptoms worsen otherwise, see pt annually  - PARoxetine (PAXIL) 40 MG tablet; Take 1 tablet by mouth daily  Dispense: 90 tablet; Refill: 1    2. Chronic left hip pain  -  Pt has failed PT and continues to have instability and pain of the left hip. Will order MRI for further evaluation.  - MRI HIP LEFT WO CONTRAST; Future    3. Family history of breast cancer  -  Pt would like genetic testing and early mammogram if deemed appropriate  - Soniya Dill MD, Breast Surgery, [facility]    4. Cervical cancer screening  - Adenike Moore DO, Obstetrics, [facility]      Return in about 1 year (around 4/12/2022) for mood. Santo Carl, was evaluated through a synchronous (real-time) audio-video encounter. The patient (or guardian if applicable) is aware that this is a billable service. Verbal consent to proceed has been obtained within the past 12 months. The visit was conducted pursuant to the emergency declaration under the [address] [place] [address], [address] [place] [address] authority and the [employer] and [employer] Act. Patient identification was verified, and a caregiver was present when appropriate. The patient was located in a state where the provider was credentialed to provide care. Total time spent on this encounter: Not billed by time    --Joya Bumpers, PA on 4/13/2021 at 8:23 AM    An electronic signature was used to authenticate this note.

## 2021-04-13 ASSESSMENT — ENCOUNTER SYMPTOMS
VOMITING: 0
COLOR CHANGE: 0
NAUSEA: 0
BACK PAIN: 0

## 2021-05-08 ENCOUNTER — IMMUNIZATION (OUTPATIENT)
Dept: PRIMARY CARE CLINIC | Age: 26
End: 2021-05-08
Payer: COMMERCIAL

## 2021-05-08 PROCEDURE — 91301 COVID-19, MODERNA VACCINE 100MCG/0.5ML DOSE: CPT | Performed by: FAMILY MEDICINE

## 2021-05-08 PROCEDURE — 0012A COVID-19, MODERNA VACCINE 100MCG/0.5ML DOSE: CPT | Performed by: FAMILY MEDICINE

## 2021-06-02 ENCOUNTER — PATIENT MESSAGE (OUTPATIENT)
Dept: FAMILY MEDICINE CLINIC | Age: 26
End: 2021-06-02

## 2021-06-02 ENCOUNTER — TELEPHONE (OUTPATIENT)
Dept: FAMILY MEDICINE CLINIC | Age: 26
End: 2021-06-02

## 2021-06-02 DIAGNOSIS — M67.952 TENDINOPATHY OF LEFT GLUTEUS MEDIUS: Primary | ICD-10-CM

## 2021-06-16 ENCOUNTER — OFFICE VISIT (OUTPATIENT)
Dept: ORTHOPEDIC SURGERY | Age: 26
End: 2021-06-16
Payer: COMMERCIAL

## 2021-06-16 VITALS — HEIGHT: 62 IN | BODY MASS INDEX: 27.6 KG/M2 | WEIGHT: 150 LBS | RESPIRATION RATE: 16 BRPM

## 2021-06-16 DIAGNOSIS — M67.959 TENDINOPATHY OF GLUTEUS MEDIUS: Primary | ICD-10-CM

## 2021-06-16 DIAGNOSIS — M25.559 HIP PAIN: ICD-10-CM

## 2021-06-16 PROCEDURE — 99243 OFF/OP CNSLTJ NEW/EST LOW 30: CPT | Performed by: ORTHOPAEDIC SURGERY

## 2021-06-16 NOTE — PROGRESS NOTES
on right   Impingement test:  negative   Right hip: negative   Labral stress test:  negative   Right hip: negative   Divya test:  mildly positive   Right: positive   Greater trochanter tenderness:  negative   Pirifiormis / Gluteus medius tenderness:  mildly positive   Iliopsoas strength:  5 / 5    Bilateral hips   Logroll:  negative   Straight-leg raise:  negative   Leg length discrepancy:  Equal   She does not have specific pain with resisted hip abduction while laying in a lateral decubitus position. She does have tight hamstrings bilaterally. Motor exam noted and recorded, quadriceps, hamstrings, foot dorsiflexors and plantar flexors are intact 5/5 equal and symmetric. Sensation is intact grossly to light touch in the tibial, peroneal, sural, and saphenous nerve distributions bilaterally. Both feet are well perfused and sensory is intact to feet equal and symmetric. There is not  any cellulitis, skin lesions, or lymphedema noted bilaterally. Imaging:  Left hip xrays:   AP pelvis xray and AP and 45 degree Javier views obtained and reviewed. They demonstrate no evidence of acute fracture, subluxation, or dislocation. The x-rays findings were Normal  Cam deformity: Absent     Left hip MRI 5/27/21: I independently reviewed the images, as well as the radiology report. 1.  No acute findings about the left hip. No labral tear. 2.  Mild insertional tendinopathy of the gluteus medius, no tear. Assessment:     Left hip gluteus medius tendinopathy  Left hip gluteus medius weakness  Tight hamstrings bilaterally  Tight IT bands bilaterally      Plan:     Natural history and expected course discussed. Questions answered. Ice as needed. NSAIDs prn. The patient was advised that NSAID-type medications have two very important potential side effects: gastrointestinal irritation including hemorrhage and renal injuries.  She was asked to take the medication with food and to stop if she experiences any GI

## 2021-12-24 DIAGNOSIS — F41.1 GENERALIZED ANXIETY DISORDER: ICD-10-CM

## 2021-12-26 NOTE — TELEPHONE ENCOUNTER
.  Refill Request     Last Seen: Last Seen Department: 4/12/2021  Last Seen by PCP: 4/12/2021    Last Written: 4/12/21 90 with 1     Next Appointment:   No future appointments.       Requested Prescriptions     Pending Prescriptions Disp Refills    PARoxetine (PAXIL) 40 MG tablet [Pharmacy Med Name: PAROXETINE 40MG TABLETS] 90 tablet 1     Sig: TAKE 1 TABLET BY MOUTH DAILY

## 2021-12-27 RX ORDER — PAROXETINE HYDROCHLORIDE 40 MG/1
40 TABLET, FILM COATED ORAL DAILY
Qty: 90 TABLET | Refills: 1 | Status: SHIPPED | OUTPATIENT
Start: 2021-12-27 | End: 2022-04-13 | Stop reason: SDUPTHER

## 2022-03-11 ENCOUNTER — OFFICE VISIT (OUTPATIENT)
Dept: FAMILY MEDICINE CLINIC | Age: 27
End: 2022-03-11
Payer: COMMERCIAL

## 2022-03-11 VITALS
WEIGHT: 160.8 LBS | HEIGHT: 62 IN | HEART RATE: 84 BPM | SYSTOLIC BLOOD PRESSURE: 130 MMHG | BODY MASS INDEX: 29.59 KG/M2 | DIASTOLIC BLOOD PRESSURE: 70 MMHG | OXYGEN SATURATION: 97 %

## 2022-03-11 DIAGNOSIS — Z80.3 FAMILY HISTORY OF BREAST CANCER IN FIRST DEGREE RELATIVE: ICD-10-CM

## 2022-03-11 DIAGNOSIS — L70.0 ACNE VULGARIS: ICD-10-CM

## 2022-03-11 DIAGNOSIS — R29.898 TMJ CLICK: ICD-10-CM

## 2022-03-11 DIAGNOSIS — K64.4 EXTERNAL BLEEDING HEMORRHOIDS: ICD-10-CM

## 2022-03-11 DIAGNOSIS — R53.83 FATIGUE, UNSPECIFIED TYPE: ICD-10-CM

## 2022-03-11 DIAGNOSIS — F41.1 GENERALIZED ANXIETY DISORDER: Primary | ICD-10-CM

## 2022-03-11 DIAGNOSIS — Z12.4 CERVICAL CANCER SCREENING: ICD-10-CM

## 2022-03-11 LAB
ALBUMIN SERPL-MCNC: 5.1 G/DL (ref 3.4–5)
ANION GAP SERPL CALCULATED.3IONS-SCNC: 14 MMOL/L (ref 3–16)
BUN BLDV-MCNC: 11 MG/DL (ref 7–20)
CALCIUM SERPL-MCNC: 10.5 MG/DL (ref 8.3–10.6)
CHLORIDE BLD-SCNC: 107 MMOL/L (ref 99–110)
CO2: 24 MMOL/L (ref 21–32)
CREAT SERPL-MCNC: 0.9 MG/DL (ref 0.6–1.1)
FERRITIN: 23.9 NG/ML (ref 15–150)
GFR AFRICAN AMERICAN: >60
GFR NON-AFRICAN AMERICAN: >60
GLUCOSE BLD-MCNC: 88 MG/DL (ref 70–99)
IRON SATURATION: 35 % (ref 15–50)
IRON: 99 UG/DL (ref 37–145)
PHOSPHORUS: 3.8 MG/DL (ref 2.5–4.9)
POTASSIUM SERPL-SCNC: 4.4 MMOL/L (ref 3.5–5.1)
SODIUM BLD-SCNC: 145 MMOL/L (ref 136–145)
TOTAL IRON BINDING CAPACITY: 281 UG/DL (ref 260–445)
TSH REFLEX: 1.64 UIU/ML (ref 0.27–4.2)
VITAMIN B-12: 309 PG/ML (ref 211–911)
VITAMIN D 25-HYDROXY: 18.1 NG/ML

## 2022-03-11 PROCEDURE — 99214 OFFICE O/P EST MOD 30 MIN: CPT | Performed by: PHYSICIAN ASSISTANT

## 2022-03-11 RX ORDER — BUSPIRONE HYDROCHLORIDE 10 MG/1
10 TABLET ORAL 2 TIMES DAILY
Qty: 60 TABLET | Refills: 3 | Status: SHIPPED | OUTPATIENT
Start: 2022-03-11 | End: 2022-04-13 | Stop reason: SDUPTHER

## 2022-03-11 ASSESSMENT — ENCOUNTER SYMPTOMS
WHEEZING: 0
SHORTNESS OF BREATH: 0
BLOOD IN STOOL: 1
CHEST TIGHTNESS: 1
ABDOMINAL DISTENTION: 0
ABDOMINAL PAIN: 0
RECTAL PAIN: 1

## 2022-03-11 NOTE — PROGRESS NOTES
Jeanie Varela (:  1995) is a 27 y.o. female,Established patient, here for evaluation of the following chief complaint(s):  Medication Check         ASSESSMENT/PLAN:  1. Generalized anxiety disorder  -     busPIRone (BUSPAR) 10 MG tablet; Take 1 tablet by mouth 2 times daily, Disp-60 tablet, R-3Normal  -     Continue with current dose of paxil, add on buspar. Follow up in 4 weeks  2. External bleeding hemorrhoids        -     Increase water and dietary fiber. May try a stool softener as well, Preparation H for bleeding or painful hemmorhoids  3. Fatigue, unspecified type  -     TSH with Reflex; Future  -     Iron and TIBC; Future  -     Ferritin; Future  -     Vitamin D 25 Hydroxy; Future  -     Vitamin B12; Future  -     Renal Function Panel; Future  4. TMJ click        -     Offered PT, patient would like to wait  5. Cervical cancer screening  -     [facility], [address] [place] [address], , Gynecology, Saurabh Mckinnon  6. Family history of breast cancer in first degree relative  -     KI DIGITAL SCREEN W OR WO CAD BILATERAL; Future  -     AFL - Abi Guo MD, Breast Surgery, [facility]      Return in about 4 weeks (around 2022) for MARIANA. Subjective   SUBJECTIVE/OBJECTIVE:  HPI  MARIANA:  Current medication: Paxil 40 mg  Side effects:  Residual symptoms: panic attacks occurring more frequently (causing chest pain), fatigue, hypersomnia, doesn't always feel hungry  Denies: decreased ADLs,     Bright Red Blood in stool  Typically constipated  Occasionally feels hemorrhoids  Has some pain with defecation  Bowel movements: once per day, hard and small bowel movements    Jaw clicking  Jaw is popping occurring when she chews  Has been going on for two years  Denies any pain, facial swelling  Wears a       Review of Systems   Constitutional: Positive for fatigue. Negative for unexpected weight change. Respiratory: Positive for chest tightness. Negative for shortness of breath and wheezing. Cardiovascular: Negative for chest pain and palpitations. Gastrointestinal: Positive for blood in stool and rectal pain. Negative for abdominal distention and abdominal pain. Genitourinary: Negative for menstrual problem. Hematological: Negative for adenopathy. Psychiatric/Behavioral: Positive for sleep disturbance. Negative for agitation, behavioral problems, confusion, decreased concentration, dysphoric mood, hallucinations, self-injury and suicidal ideas. The patient is nervous/anxious. The patient is not hyperactive. Objective   Physical Exam  Vitals reviewed. Constitutional:       Appearance: Normal appearance. Neck:      Thyroid: No thyromegaly. Cardiovascular:      Rate and Rhythm: Normal rate and regular rhythm. Heart sounds: Normal heart sounds. Pulmonary:      Effort: Pulmonary effort is normal.      Breath sounds: Normal breath sounds. Genitourinary:     Rectum: External hemorrhoid present. Musculoskeletal:      Right lower leg: No edema. Left lower leg: No edema. Skin:     Coloration: Skin is not pale. Neurological:      Mental Status: She is alert. Psychiatric:         Attention and Perception: Attention and perception normal.         Mood and Affect: Affect normal. Mood is anxious. Speech: Speech normal.         Behavior: Behavior normal. Behavior is cooperative. Thought Content: Thought content normal.         Cognition and Memory: Cognition and memory normal.         Judgment: Judgment normal.                  An electronic signature was used to authenticate this note.     --BRIONNA Ochoa

## 2022-04-13 ENCOUNTER — OFFICE VISIT (OUTPATIENT)
Dept: FAMILY MEDICINE CLINIC | Age: 27
End: 2022-04-13
Payer: COMMERCIAL

## 2022-04-13 VITALS
OXYGEN SATURATION: 99 % | SYSTOLIC BLOOD PRESSURE: 104 MMHG | HEART RATE: 77 BPM | BODY MASS INDEX: 29.08 KG/M2 | DIASTOLIC BLOOD PRESSURE: 68 MMHG | WEIGHT: 159 LBS

## 2022-04-13 DIAGNOSIS — F41.1 GENERALIZED ANXIETY DISORDER: Primary | ICD-10-CM

## 2022-04-13 PROCEDURE — 99213 OFFICE O/P EST LOW 20 MIN: CPT | Performed by: PHYSICIAN ASSISTANT

## 2022-04-13 RX ORDER — BUSPIRONE HYDROCHLORIDE 10 MG/1
10 TABLET ORAL 2 TIMES DAILY
Qty: 180 TABLET | Refills: 1 | Status: SHIPPED | OUTPATIENT
Start: 2022-04-13 | End: 2022-07-12

## 2022-04-13 RX ORDER — PAROXETINE HYDROCHLORIDE 40 MG/1
40 TABLET, FILM COATED ORAL DAILY
Qty: 90 TABLET | Refills: 1 | Status: SHIPPED | OUTPATIENT
Start: 2022-04-13

## 2022-04-13 SDOH — ECONOMIC STABILITY: FOOD INSECURITY: WITHIN THE PAST 12 MONTHS, YOU WORRIED THAT YOUR FOOD WOULD RUN OUT BEFORE YOU GOT MONEY TO BUY MORE.: NEVER TRUE

## 2022-04-13 SDOH — ECONOMIC STABILITY: TRANSPORTATION INSECURITY
IN THE PAST 12 MONTHS, HAS LACK OF TRANSPORTATION KEPT YOU FROM MEETINGS, WORK, OR FROM GETTING THINGS NEEDED FOR DAILY LIVING?: NO

## 2022-04-13 SDOH — ECONOMIC STABILITY: INCOME INSECURITY: IN THE LAST 12 MONTHS, WAS THERE A TIME WHEN YOU WERE NOT ABLE TO PAY THE MORTGAGE OR RENT ON TIME?: NO

## 2022-04-13 SDOH — ECONOMIC STABILITY: TRANSPORTATION INSECURITY
IN THE PAST 12 MONTHS, HAS THE LACK OF TRANSPORTATION KEPT YOU FROM MEDICAL APPOINTMENTS OR FROM GETTING MEDICATIONS?: NO

## 2022-04-13 SDOH — ECONOMIC STABILITY: HOUSING INSECURITY
IN THE LAST 12 MONTHS, WAS THERE A TIME WHEN YOU DID NOT HAVE A STEADY PLACE TO SLEEP OR SLEPT IN A SHELTER (INCLUDING NOW)?: NO

## 2022-04-13 SDOH — ECONOMIC STABILITY: FOOD INSECURITY: WITHIN THE PAST 12 MONTHS, THE FOOD YOU BOUGHT JUST DIDN'T LAST AND YOU DIDN'T HAVE MONEY TO GET MORE.: NEVER TRUE

## 2022-04-13 SDOH — ECONOMIC STABILITY: HOUSING INSECURITY: IN THE LAST 12 MONTHS, HOW MANY PLACES HAVE YOU LIVED?: 0

## 2022-04-13 ASSESSMENT — SOCIAL DETERMINANTS OF HEALTH (SDOH): HOW HARD IS IT FOR YOU TO PAY FOR THE VERY BASICS LIKE FOOD, HOUSING, MEDICAL CARE, AND HEATING?: NOT HARD AT ALL

## 2022-04-13 ASSESSMENT — PATIENT HEALTH QUESTIONNAIRE - PHQ9
SUM OF ALL RESPONSES TO PHQ QUESTIONS 1-9: 0
SUM OF ALL RESPONSES TO PHQ QUESTIONS 1-9: 0
1. LITTLE INTEREST OR PLEASURE IN DOING THINGS: 0
SUM OF ALL RESPONSES TO PHQ QUESTIONS 1-9: 0
2. FEELING DOWN, DEPRESSED OR HOPELESS: 0
SUM OF ALL RESPONSES TO PHQ9 QUESTIONS 1 & 2: 0
SUM OF ALL RESPONSES TO PHQ QUESTIONS 1-9: 0

## 2022-04-13 NOTE — PROGRESS NOTES
Bill Green (:  1995) is a 27 y.o. female,Established patient, here for evaluation of the following chief complaint(s): Anxiety (follow up )         ASSESSMENT/PLAN:  1. Generalized anxiety disorder  -     busPIRone (BUSPAR) 10 MG tablet; Take 1 tablet by mouth 2 times daily, Disp-180 tablet, R-1Normal  -     PARoxetine (PAXIL) 40 MG tablet; Take 1 tablet by mouth daily, Disp-90 tablet, R-1Normal       -    Well controlled, continue with current dose of medication, follow up in one year or sooner if needed    Return in about 1 year (around 2023) for MARIANA. Subjective   SUBJECTIVE/OBJECTIVE:  HPI  MARIANA:  Current medication: paxil and buspar  buspar added at her last appointment  Side effects: some panic like symptoms when she first started, very mild dizziness, these have improved with time  Residual symptoms: having 1-2 panic attacks per week, major improvement  Denies: sleep issues, appetite change, dysphoric mood  Pt is overall happy with this medication    Review of Systems   Constitutional: Negative for activity change, appetite change and unexpected weight change. Cardiovascular: Negative for chest pain and palpitations. Neurological: Positive for dizziness. Negative for light-headedness. Psychiatric/Behavioral: Negative for agitation, behavioral problems, confusion, decreased concentration, dysphoric mood, hallucinations, self-injury, sleep disturbance and suicidal ideas. The patient is nervous/anxious. The patient is not hyperactive. Objective   Physical Exam  Vitals reviewed. Constitutional:       Appearance: Normal appearance. HENT:      Head: Normocephalic and atraumatic. Neurological:      Mental Status: She is alert and oriented to person, place, and time. Cranial Nerves: No cranial nerve deficit. Psychiatric:         Attention and Perception: Attention and perception normal.         Mood and Affect: Affect normal. Mood is anxious.          Speech: Speech normal.         Behavior: Behavior normal. Behavior is cooperative. Thought Content: Thought content normal.         Cognition and Memory: Cognition and memory normal.         Judgment: Judgment normal.                  An electronic signature was used to authenticate this note.     --BRIONNA Hudson

## 2022-07-06 ENCOUNTER — TELEPHONE (OUTPATIENT)
Dept: FAMILY MEDICINE CLINIC | Age: 27
End: 2022-07-06

## 2022-07-06 NOTE — LETTER
[address] [address]  Phone: [phone]  Fax: [phone]    Kaylen Mcdaniels        July 14, 2022    [address] [place] [address]      Dear Tara Gregory: The office has tried to reach you via phone but has been unsuccessful. You were given a referral to Dr. Sierra Mata on 3/11/22. Dr. Sierra Mata is a breast surgeon/specialist with [facility]. Dr. Panchito Camejo office has reached out to our office as well stating they can not get a hold of you to schedule. Do you still need this referral? Do you need help scheduling? Please contact the office and let us know. If you have any questions or concerns, please don't hesitate to call.     Sincerely,        BRIONNA Mcdaniels

## 2022-07-06 NOTE — TELEPHONE ENCOUNTER
Jake from [facility] has called and said they have tried to call this pt 5 times to get scheduled and no answer or call back was received

## 2022-11-05 DIAGNOSIS — F41.1 GENERALIZED ANXIETY DISORDER: ICD-10-CM

## 2022-11-05 NOTE — TELEPHONE ENCOUNTER
Refill Request     CONFIRM preferrred pharmacy with the patient. If Mail Order Rx - Pend for 90 day refill. Last Seen: Last Seen Department: 4/13/2022  Last Seen by PCP: 4/13/2022    Last Written: 4/13/2022 90 tablet 1 refills    If no future appointment scheduled, route STAFF MESSAGE with patient name to the [facility] for scheduling. Next Appointment:   No future appointments. Message sent to [address] [place] [address] to schedule appt with patient? YES  Return in about 1 year (around 4/13/2023) for MARIANA.     Requested Prescriptions     Pending Prescriptions Disp Refills    PARoxetine (PAXIL) 40 MG tablet [Pharmacy Med Name: PAROXETINE 40MG TABLETS] 90 tablet 1     Sig: TAKE 1 TABLET BY MOUTH DAILY

## 2022-11-07 RX ORDER — PAROXETINE HYDROCHLORIDE 40 MG/1
40 TABLET, FILM COATED ORAL DAILY
Qty: 90 TABLET | Refills: 1 | Status: SHIPPED | OUTPATIENT
Start: 2022-11-07

## 2023-03-22 ENCOUNTER — OFFICE VISIT (OUTPATIENT)
Dept: FAMILY MEDICINE CLINIC | Age: 28
End: 2023-03-22
Payer: COMMERCIAL

## 2023-03-22 VITALS
DIASTOLIC BLOOD PRESSURE: 60 MMHG | HEART RATE: 78 BPM | HEIGHT: 62 IN | SYSTOLIC BLOOD PRESSURE: 110 MMHG | BODY MASS INDEX: 26.87 KG/M2 | OXYGEN SATURATION: 98 % | WEIGHT: 146 LBS

## 2023-03-22 DIAGNOSIS — Z80.3 FAMILY HISTORY OF BREAST CANCER IN FIRST DEGREE RELATIVE: ICD-10-CM

## 2023-03-22 DIAGNOSIS — F41.1 GENERALIZED ANXIETY DISORDER: Primary | ICD-10-CM

## 2023-03-22 DIAGNOSIS — Z12.31 ENCOUNTER FOR SCREENING MAMMOGRAM FOR MALIGNANT NEOPLASM OF BREAST: ICD-10-CM

## 2023-03-22 PROCEDURE — 99213 OFFICE O/P EST LOW 20 MIN: CPT | Performed by: PHYSICIAN ASSISTANT

## 2023-03-22 RX ORDER — PAROXETINE HYDROCHLORIDE 40 MG/1
40 TABLET, FILM COATED ORAL DAILY
Qty: 90 TABLET | Refills: 3 | Status: SHIPPED | OUTPATIENT
Start: 2023-03-22

## 2023-03-22 ASSESSMENT — PATIENT HEALTH QUESTIONNAIRE - PHQ9
SUM OF ALL RESPONSES TO PHQ QUESTIONS 1-9: 0
7. TROUBLE CONCENTRATING ON THINGS, SUCH AS READING THE NEWSPAPER OR WATCHING TELEVISION: 0
SUM OF ALL RESPONSES TO PHQ9 QUESTIONS 1 & 2: 0
10. IF YOU CHECKED OFF ANY PROBLEMS, HOW DIFFICULT HAVE THESE PROBLEMS MADE IT FOR YOU TO DO YOUR WORK, TAKE CARE OF THINGS AT HOME, OR GET ALONG WITH OTHER PEOPLE: 0
SUM OF ALL RESPONSES TO PHQ QUESTIONS 1-9: 0
5. POOR APPETITE OR OVEREATING: 0
1. LITTLE INTEREST OR PLEASURE IN DOING THINGS: 0
DEPRESSION UNABLE TO ASSESS: FUNCTIONAL CAPACITY MOTIVATION LIMITS ACCURACY
SUM OF ALL RESPONSES TO PHQ QUESTIONS 1-9: 0
8. MOVING OR SPEAKING SO SLOWLY THAT OTHER PEOPLE COULD HAVE NOTICED. OR THE OPPOSITE, BEING SO FIGETY OR RESTLESS THAT YOU HAVE BEEN MOVING AROUND A LOT MORE THAN USUAL: 0
4. FEELING TIRED OR HAVING LITTLE ENERGY: 0
6. FEELING BAD ABOUT YOURSELF - OR THAT YOU ARE A FAILURE OR HAVE LET YOURSELF OR YOUR FAMILY DOWN: 0
9. THOUGHTS THAT YOU WOULD BE BETTER OFF DEAD, OR OF HURTING YOURSELF: 0
SUM OF ALL RESPONSES TO PHQ QUESTIONS 1-9: 0
3. TROUBLE FALLING OR STAYING ASLEEP: 0
2. FEELING DOWN, DEPRESSED OR HOPELESS: 0

## 2023-03-22 NOTE — PROGRESS NOTES
Jonathan Childs (:  1995) is a 32 y.o. female,Established patient, here for evaluation of the following chief complaint(s): Anxiety         ASSESSMENT/PLAN:  1. Generalized anxiety disorder  -     PARoxetine (PAXIL) 40 MG tablet; Take 1 tablet by mouth daily, Disp-90 tablet, R-3Normal  -   pt's anxiety is well controlled on paxil. Will have her continue with paxil 40 mg and follow up in one year or sooner if needed  2. Encounter for screening mammogram for malignant neoplasm of breast  -     [facility] DIGITAL SCREEN W OR WO CAD BILATERAL; Future  3. Family history of breast cancer in first degree relative  -     BRIAN - Kash Padilla MD, Breast Surgery, Located within [facility]      Return in about 1 year (around 3/22/2024) for anxiety. Subjective   SUBJECTIVE/OBJECTIVE:  HPI    MARIANA:  Current medication: paxil  Side effects: none  Pt states that this is the best that she has felt in a long time  She denies any panic attacks, tearfulness, intrusive thoughts, depression  She would like to continue with her current dose of medication    PAP smear coming up, plans on getting an IUD  Due for mammogram, would like to complete and get a referral to see a breast specialist due to her mother's history of breast cancer    Review of Systems   Constitutional:  Negative for activity change, appetite change, diaphoresis and unexpected weight change. Eyes:  Negative for visual disturbance. Neurological:  Negative for dizziness, light-headedness and headaches. Psychiatric/Behavioral:  Negative for agitation, behavioral problems, confusion, decreased concentration, dysphoric mood, hallucinations, self-injury, sleep disturbance and suicidal ideas. The patient is nervous/anxious. The patient is not hyperactive. Objective   Physical Exam  Vitals reviewed. Constitutional:       Appearance: Normal appearance. HENT:      Head: Normocephalic and atraumatic. Neck:      Thyroid: No thyromegaly.    Cardiovascular:

## 2023-03-31 ENCOUNTER — TELEPHONE (OUTPATIENT)
Dept: FAMILY MEDICINE CLINIC | Age: 28
End: 2023-03-31

## 2023-03-31 DIAGNOSIS — Z80.3 FAMILY HISTORY OF BREAST CANCER IN FIRST DEGREE RELATIVE: Primary | ICD-10-CM

## 2023-03-31 DIAGNOSIS — Z12.31 ENCOUNTER FOR SCREENING MAMMOGRAM FOR MALIGNANT NEOPLASM OF BREAST: ICD-10-CM

## 2023-04-14 LAB — PAP SMEAR, EXTERNAL: NEGATIVE

## 2023-10-19 ENCOUNTER — TELEPHONE (OUTPATIENT)
Dept: FAMILY MEDICINE CLINIC | Age: 28
End: 2023-10-19